# Patient Record
Sex: FEMALE | Race: WHITE | Employment: OTHER | URBAN - METROPOLITAN AREA
[De-identification: names, ages, dates, MRNs, and addresses within clinical notes are randomized per-mention and may not be internally consistent; named-entity substitution may affect disease eponyms.]

---

## 2017-01-03 ENCOUNTER — OFFICE VISIT (OUTPATIENT)
Dept: INTERNAL MEDICINE CLINIC | Facility: CLINIC | Age: 67
End: 2017-01-03

## 2017-01-03 VITALS
WEIGHT: 173 LBS | RESPIRATION RATE: 16 BRPM | BODY MASS INDEX: 26.22 KG/M2 | TEMPERATURE: 98 F | SYSTOLIC BLOOD PRESSURE: 126 MMHG | DIASTOLIC BLOOD PRESSURE: 78 MMHG | HEART RATE: 61 BPM | HEIGHT: 68 IN

## 2017-01-03 DIAGNOSIS — Z12.31 ENCOUNTER FOR SCREENING MAMMOGRAM FOR HIGH-RISK PATIENT: ICD-10-CM

## 2017-01-03 DIAGNOSIS — R92.2 DENSE BREAST TISSUE ON MAMMOGRAM: ICD-10-CM

## 2017-01-03 DIAGNOSIS — D70.9 NEUTROPENIA, UNSPECIFIED TYPE (HCC): Primary | ICD-10-CM

## 2017-01-03 DIAGNOSIS — I10 ESSENTIAL HYPERTENSION: ICD-10-CM

## 2017-01-03 PROBLEM — R92.30 DENSE BREAST TISSUE ON MAMMOGRAM: Status: ACTIVE | Noted: 2017-01-03

## 2017-01-03 PROCEDURE — 99214 OFFICE O/P EST MOD 30 MIN: CPT | Performed by: INTERNAL MEDICINE

## 2017-01-03 PROCEDURE — G0463 HOSPITAL OUTPT CLINIC VISIT: HCPCS | Performed by: INTERNAL MEDICINE

## 2017-01-04 NOTE — ASSESSMENT & PLAN NOTE
Blood pressure 126/78, pulse 61, temperature 98.2 °F (36.8 °C), temperature source Oral, resp. rate 16, height 5' 8\" (1.727 m), weight 173 lb (78.472 kg). The blood pressures look very well controlled at this time.   Currently on losartan at 25 mg 1 tabl

## 2017-01-04 NOTE — PATIENT INSTRUCTIONS
Problem List Items Addressed This Visit        Unprioritized    Hypertension     Blood pressure 126/78, pulse 61, temperature 98.2 °F (36.8 °C), temperature source Oral, resp. rate 16, height 5' 8\" (1.727 m), weight 173 lb (78.472 kg).    The blood pressur

## 2017-01-04 NOTE — PROGRESS NOTES
HPI:    Patient ID: Tc Herbert is a 74year old female.     HPI Comments:   Immunization History  Administered            Date(s) Administered    Influenza Vaccine, High Dose, Preserv Free                          10/01/2016      Pneumococcal (Pre EVERY DAY Disp: 90 tablet Rfl: 1   aspirin 81 MG Oral Tab Take 81 mg by mouth daily. Disp:  Rfl:    Multiple Vitamins-Minerals (CENTRUM SILVER) Oral Tab Take 1 tablet by mouth daily.  Disp:  Rfl:    Omega-3 Fatty Acids (FISH OIL) 1200 MG Oral Capsule [place] ASSESSMENT/PLAN:   Neutropenia, unspecified type (hcc)  (primary encounter diagnosis)  Essential hypertension  Encounter for screening mammogram for high-risk patient  Dense breast tissue on mammogram  Problem List Items Addressed This Visit        Eddie Duval [E]  CBC W Differential W Platelet [E]    Meds This Visit:  No prescriptions requested or ordered in this encounter    Imaging & Referrals:  [facility] OWEN 2D+3D SCREENING BILAT (CPT=77067/00340)  US BREAST BILAT WHOLE BREAST AWBUS(CPT=76641)       XC#[id number]

## 2017-03-21 ENCOUNTER — TELEPHONE (OUTPATIENT)
Dept: OPHTHALMOLOGY | Facility: CLINIC | Age: 67
End: 2017-03-21

## 2017-03-21 NOTE — TELEPHONE ENCOUNTER
Spoke with patient's son to let him know that we need to reschedule patient's eye exam today with Dr. Wendy Tejada.  He will have patient call our office today to reschedule her appointment/nw

## 2017-03-27 RX ORDER — LOSARTAN POTASSIUM 25 MG/1
TABLET ORAL
Qty: 90 TABLET | Refills: 0 | Status: SHIPPED | OUTPATIENT
Start: 2017-03-27 | End: 2017-06-30

## 2017-03-27 NOTE — TELEPHONE ENCOUNTER
Rx request for Losartan Potassium 25 mg, PASSED Hypertension Protocol. Rx filled per protocol.      Hypertensive Medications  Protocol Criteria:  · Appointment scheduled in the past 6 months or in the next 3 months  · BMP or CMP in the past 12 months  · Cre

## 2017-03-30 ENCOUNTER — OFFICE VISIT (OUTPATIENT)
Dept: OPHTHALMOLOGY | Facility: CLINIC | Age: 67
End: 2017-03-30

## 2017-03-30 DIAGNOSIS — H40.003 GLAUCOMA SUSPECT OF BOTH EYES: Primary | ICD-10-CM

## 2017-03-30 DIAGNOSIS — H25.13 AGE-RELATED NUCLEAR CATARACT OF BOTH EYES: ICD-10-CM

## 2017-03-30 PROCEDURE — 92015 DETERMINE REFRACTIVE STATE: CPT | Performed by: OPHTHALMOLOGY

## 2017-03-30 PROCEDURE — 92250 FUNDUS PHOTOGRAPHY W/I&R: CPT | Performed by: OPHTHALMOLOGY

## 2017-03-30 PROCEDURE — 92014 COMPRE OPH EXAM EST PT 1/>: CPT | Performed by: OPHTHALMOLOGY

## 2017-03-30 NOTE — ASSESSMENT & PLAN NOTE
Discussed with patient that she is a glaucoma suspect based on increased cupping of the optic nerve in the right eye and cup to disc asymmetry of the optic nerves. .  Retinal photos taken today to document optic nerves.   Glaucoma diagnostic testing ordered

## 2017-03-30 NOTE — PROGRESS NOTES
Ning Howard is a 74year old female. HPI:     HPI     Patient is here for a complete eye exam. Patient states that she has a decrease in her night vision for the past year.        Last edited by Susanna Macias O.T. on 3/30/2017  3:15 PM.     Adriana Arvizu Slit Lamp and Fundus Exam     External Exam      Right Left    External Normal Normal      Slit Lamp Exam      Right Left    Lids/Lashes Dermatochalasis, Meibomian gland dysfunction Dermatochalasis, Meibomian gland dysfunction    Conjunctiva/Sclera Temp requested or ordered in this encounter     Follow up instructions:  Return for Next avail, VF,OCT and pachy w/ no MD, If glaucoma diagnostics are wnl, 1 yr dil EE.    3/30/2017  Scribed by: Aristeo Arce MD

## 2017-03-30 NOTE — PATIENT INSTRUCTIONS
Age-related nuclear cataract of both eyes  Discussed early cataracts with patient. No treatment recommended at this time. New glasses today; update as needed. Discussed that new prescription is only a slight change.        Glaucoma suspect of both eyes You may also have headaches, nausea, vomiting, and severe pain. If not treated right away, blindness can occur quickly. Date Last Reviewed: 6/1/2015  © 2750-2068 The [facility], [address] [place] [address]. All rights reserved.  Alphonsa Million

## 2017-04-29 ENCOUNTER — OFFICE VISIT (OUTPATIENT)
Dept: OPHTHALMOLOGY | Facility: CLINIC | Age: 67
End: 2017-04-29

## 2017-04-29 DIAGNOSIS — H40.003 GLAUCOMA SUSPECT OF BOTH EYES: ICD-10-CM

## 2017-04-29 PROCEDURE — 92083 EXTENDED VISUAL FIELD XM: CPT | Performed by: OPHTHALMOLOGY

## 2017-04-29 PROCEDURE — 76514 ECHO EXAM OF EYE THICKNESS: CPT | Performed by: OPHTHALMOLOGY

## 2017-04-29 PROCEDURE — 92133 CPTRZD OPH DX IMG PST SGM ON: CPT | Performed by: OPHTHALMOLOGY

## 2017-04-30 NOTE — PROGRESS NOTES
Thom Castaneda is a 69year old female. HPI:     HPI     Patient is here for a glaucoma work up with HVF, OCT and Pachy, no M.D.        Last edited by Jeffrey Aleman on 4/29/2017  8:59 AM.     Patient History:  Past Medical History   Diagnosis Date

## 2017-06-30 ENCOUNTER — TELEPHONE (OUTPATIENT)
Dept: INTERNAL MEDICINE CLINIC | Facility: CLINIC | Age: 67
End: 2017-06-30

## 2017-06-30 RX ORDER — LOSARTAN POTASSIUM 25 MG/1
25 TABLET ORAL
Qty: 90 TABLET | Refills: 0 | Status: CANCELLED | OUTPATIENT
Start: 2017-06-30

## 2017-06-30 RX ORDER — LOSARTAN POTASSIUM 25 MG/1
25 TABLET ORAL
Qty: 90 TABLET | Refills: 0 | Status: SHIPPED | OUTPATIENT
Start: 2017-06-30 | End: 2017-09-21

## 2017-06-30 NOTE — TELEPHONE ENCOUNTER
Hypertensive Medications  Protocol Criteria:  · Appointment scheduled in the past 6 months or in the next 3 months  · BMP or CMP in the past 12 months  · Creatinine result < 2  Recent Outpatient Visits            2 months ago Glaucoma suspect of both eye

## 2017-06-30 NOTE — TELEPHONE ENCOUNTER
LOSARTAN Medication refilled for 90 days per protocol.     Hypertensive Medications  Protocol Criteria:  · Appointment scheduled in the past 6 months or in the next 3 months  · BMP or CMP in the past 12 months  · Creatinine result < 2  Recent Outpatient Vis

## 2017-06-30 NOTE — TELEPHONE ENCOUNTER
Pt states Susu Warner has been faxing over refill request since last week, and have not heard back. Pt states she is completely out.        Current Outpatient Prescriptions:   •  LOSARTAN POTASSIUM 25 MG Oral Tab, TAKE ONE TABLET BY MOUTH EVERY DAY, Disp: 90

## 2017-09-18 ENCOUNTER — LAB ENCOUNTER (OUTPATIENT)
Dept: LAB | Facility: HOSPITAL | Age: 67
End: 2017-09-18
Attending: INTERNAL MEDICINE
Payer: MEDICARE

## 2017-09-18 DIAGNOSIS — I10 ESSENTIAL HYPERTENSION: ICD-10-CM

## 2017-09-18 DIAGNOSIS — D70.9 NEUTROPENIA, UNSPECIFIED TYPE (HCC): ICD-10-CM

## 2017-09-18 LAB
ALBUMIN SERPL BCP-MCNC: 3.7 G/DL (ref 3.5–4.8)
ALBUMIN/GLOB SERPL: 1.5 {RATIO} (ref 1–2)
ALP SERPL-CCNC: 62 U/L (ref 32–100)
ALT SERPL-CCNC: 21 U/L (ref 14–54)
ANION GAP SERPL CALC-SCNC: 4 MMOL/L (ref 0–18)
AST SERPL-CCNC: 26 U/L (ref 15–41)
BASOPHILS # BLD: 0 K/UL (ref 0–0.2)
BASOPHILS NFR BLD: 1 %
BILIRUB SERPL-MCNC: 0.9 MG/DL (ref 0.3–1.2)
BILIRUB UR QL: NEGATIVE
BUN SERPL-MCNC: 14 MG/DL (ref 8–20)
BUN/CREAT SERPL: 19.4 (ref 10–20)
CALCIUM SERPL-MCNC: 8.7 MG/DL (ref 8.5–10.5)
CHLORIDE SERPL-SCNC: 104 MMOL/L (ref 95–110)
CHOLEST SERPL-MCNC: 186 MG/DL (ref 110–200)
CLARITY UR: CLEAR
CO2 SERPL-SCNC: 29 MMOL/L (ref 22–32)
COLOR UR: YELLOW
CREAT SERPL-MCNC: 0.72 MG/DL (ref 0.5–1.5)
EOSINOPHIL # BLD: 0.1 K/UL (ref 0–0.7)
EOSINOPHIL NFR BLD: 4 %
ERYTHROCYTE [DISTWIDTH] IN BLOOD BY AUTOMATED COUNT: 13.8 % (ref 11–15)
GLOBULIN PLAS-MCNC: 2.5 G/DL (ref 2.5–3.7)
GLUCOSE SERPL-MCNC: 84 MG/DL (ref 70–99)
GLUCOSE UR-MCNC: NEGATIVE MG/DL
HCT VFR BLD AUTO: 39.6 % (ref 35–48)
HDLC SERPL-MCNC: 71 MG/DL
HGB BLD-MCNC: 13.5 G/DL (ref 12–16)
HGB UR QL STRIP.AUTO: NEGATIVE
KETONES UR-MCNC: NEGATIVE MG/DL
LDLC SERPL CALC-MCNC: 104 MG/DL (ref 0–99)
LEUKOCYTE ESTERASE UR QL STRIP.AUTO: NEGATIVE
LYMPHOCYTES # BLD: 1.3 K/UL (ref 1–4)
LYMPHOCYTES NFR BLD: 40 %
MCH RBC QN AUTO: 31.2 PG (ref 27–32)
MCHC RBC AUTO-ENTMCNC: 34.2 G/DL (ref 32–37)
MCV RBC AUTO: 91.2 FL (ref 80–100)
MONOCYTES # BLD: 0.3 K/UL (ref 0–1)
MONOCYTES NFR BLD: 10 %
NEUTROPHILS # BLD AUTO: 1.5 K/UL (ref 1.8–7.7)
NEUTROPHILS NFR BLD: 46 %
NITRITE UR QL STRIP.AUTO: NEGATIVE
NONHDLC SERPL-MCNC: 115 MG/DL
OSMOLALITY UR CALC.SUM OF ELEC: 284 MOSM/KG (ref 275–295)
PH UR: 7 [PH] (ref 5–8)
PLATELET # BLD AUTO: 216 K/UL (ref 140–400)
PMV BLD AUTO: 9.1 FL (ref 7.4–10.3)
POTASSIUM SERPL-SCNC: 4 MMOL/L (ref 3.3–5.1)
PROT SERPL-MCNC: 6.2 G/DL (ref 5.9–8.4)
PROT UR-MCNC: NEGATIVE MG/DL
RBC # BLD AUTO: 4.34 M/UL (ref 3.7–5.4)
SODIUM SERPL-SCNC: 137 MMOL/L (ref 136–144)
SP GR UR STRIP: 1.01 (ref 1–1.03)
TRIGL SERPL-MCNC: 55 MG/DL (ref 1–149)
UROBILINOGEN UR STRIP-ACNC: <2
VIT C UR-MCNC: NEGATIVE MG/DL
WBC # BLD AUTO: 3.2 K/UL (ref 4–11)

## 2017-09-18 PROCEDURE — 80053 COMPREHEN METABOLIC PANEL: CPT

## 2017-09-18 PROCEDURE — 80061 LIPID PANEL: CPT

## 2017-09-18 PROCEDURE — 85025 COMPLETE CBC W/AUTO DIFF WBC: CPT

## 2017-09-18 PROCEDURE — 81003 URINALYSIS AUTO W/O SCOPE: CPT

## 2017-09-18 PROCEDURE — 36415 COLL VENOUS BLD VENIPUNCTURE: CPT

## 2017-09-19 ENCOUNTER — PATIENT OUTREACH (OUTPATIENT)
Dept: INTERNAL MEDICINE CLINIC | Facility: CLINIC | Age: 67
End: 2017-09-19

## 2017-09-21 ENCOUNTER — OFFICE VISIT (OUTPATIENT)
Dept: INTERNAL MEDICINE CLINIC | Facility: CLINIC | Age: 67
End: 2017-09-21

## 2017-09-21 ENCOUNTER — MED REC SCAN ONLY (OUTPATIENT)
Dept: INTERNAL MEDICINE CLINIC | Facility: CLINIC | Age: 67
End: 2017-09-21

## 2017-09-21 VITALS
WEIGHT: 168.69 LBS | BODY MASS INDEX: 25.57 KG/M2 | RESPIRATION RATE: 18 BRPM | DIASTOLIC BLOOD PRESSURE: 80 MMHG | HEART RATE: 59 BPM | SYSTOLIC BLOOD PRESSURE: 136 MMHG | TEMPERATURE: 98 F | HEIGHT: 68 IN

## 2017-09-21 DIAGNOSIS — I10 ESSENTIAL HYPERTENSION: ICD-10-CM

## 2017-09-21 DIAGNOSIS — Z23 NEED FOR VACCINATION: Primary | ICD-10-CM

## 2017-09-21 DIAGNOSIS — D70.8 OTHER NEUTROPENIA (HCC): ICD-10-CM

## 2017-09-21 PROCEDURE — 99214 OFFICE O/P EST MOD 30 MIN: CPT | Performed by: INTERNAL MEDICINE

## 2017-09-21 PROCEDURE — G0009 ADMIN PNEUMOCOCCAL VACCINE: HCPCS | Performed by: INTERNAL MEDICINE

## 2017-09-21 PROCEDURE — G0463 HOSPITAL OUTPT CLINIC VISIT: HCPCS | Performed by: INTERNAL MEDICINE

## 2017-09-21 PROCEDURE — 90653 IIV ADJUVANT VACCINE IM: CPT | Performed by: INTERNAL MEDICINE

## 2017-09-21 PROCEDURE — 90732 PPSV23 VACC 2 YRS+ SUBQ/IM: CPT | Performed by: INTERNAL MEDICINE

## 2017-09-21 PROCEDURE — G0008 ADMIN INFLUENZA VIRUS VAC: HCPCS | Performed by: INTERNAL MEDICINE

## 2017-09-21 RX ORDER — LOSARTAN POTASSIUM 25 MG/1
25 TABLET ORAL
Qty: 90 TABLET | Refills: 1 | Status: SHIPPED | OUTPATIENT
Start: 2017-09-21 | End: 2018-03-22

## 2017-09-21 RX ORDER — LOSARTAN POTASSIUM 25 MG/1
25 TABLET ORAL
Qty: 90 TABLET | Refills: 1 | Status: CANCELLED | OUTPATIENT
Start: 2017-09-21

## 2017-09-22 NOTE — PROGRESS NOTES
HPI:    Patient ID: Thom Castaneda is a 69year old female.       Immunization History  Administered            Date(s) Administered    Fluad High dose 72 yr and older (42803)                          09/21/2017      Influenza Vaccine, High Dose, Pres mass index is 25.65 kg/m².   Wt Readings from Last 6 Encounters:  09/21/17 : 168 lb 11.2 oz (76.5 kg)  01/03/17 : 173 lb (78.5 kg)  06/30/16 : 167 lb (75.8 kg)  12/08/15 : 163 lb (73.9 kg)  06/16/15 : 167 lb 6.4 oz (75.9 kg)  04/28/15 : 170 lb (77.1 kg) losartan at 25 mg 1 tablet once daily and will recheck labs and follow-up in 6 months for a full physical.  Pneumovax 23 provided today         Relevant Medications    Losartan Potassium 25 MG Oral Tab    Other Relevant Orders    CBC WITH DIFFERENTIAL WITH

## 2017-09-22 NOTE — PATIENT INSTRUCTIONS
Problem List Items Addressed This Visit        Unprioritized    Hypertension     Blood pressure 143/87, pulse 59, temperature 98.1 °F (36.7 °C), temperature source Oral, resp.  rate 18, height 5' 8\" (1.727 m), weight 168 lb 11.2 oz (76.5 kg), not currently

## 2017-09-22 NOTE — ASSESSMENT & PLAN NOTE
Blood pressure 143/87, pulse 59, temperature 98.1 °F (36.7 °C), temperature source Oral, resp. rate 18, height 5' 8\" (1.727 m), weight 168 lb 11.2 oz (76.5 kg), not currently breastfeeding. Blood pressures look stable and well controlled.   EGFR looks nor

## 2017-09-22 NOTE — ASSESSMENT & PLAN NOTE
Unexplained first episode of neutropenia. Patient is asymptomatic and has not been having any recent problems with coughs or colds. Recheck labs about once a month for the next 2-3 months and will follow up.   Patient has been advised to give me a call if

## 2017-10-23 ENCOUNTER — LAB ENCOUNTER (OUTPATIENT)
Dept: LAB | Facility: HOSPITAL | Age: 67
End: 2017-10-23
Attending: INTERNAL MEDICINE
Payer: MEDICARE

## 2017-10-23 DIAGNOSIS — D70.8 OTHER NEUTROPENIA (HCC): ICD-10-CM

## 2017-10-23 PROCEDURE — 85025 COMPLETE CBC W/AUTO DIFF WBC: CPT

## 2017-10-23 PROCEDURE — 36415 COLL VENOUS BLD VENIPUNCTURE: CPT

## 2017-10-26 ENCOUNTER — TELEPHONE (OUTPATIENT)
Dept: INTERNAL MEDICINE CLINIC | Facility: CLINIC | Age: 67
End: 2017-10-26

## 2017-10-26 NOTE — TELEPHONE ENCOUNTER
Per pt, she had a blood test Monday in [facility] and would like to tell AMPARO.

## 2017-10-29 ENCOUNTER — OFFICE VISIT (OUTPATIENT)
Dept: FAMILY MEDICINE CLINIC | Facility: CLINIC | Age: 67
End: 2017-10-29

## 2017-10-29 VITALS
DIASTOLIC BLOOD PRESSURE: 84 MMHG | SYSTOLIC BLOOD PRESSURE: 124 MMHG | OXYGEN SATURATION: 98 % | BODY MASS INDEX: 25.61 KG/M2 | WEIGHT: 169 LBS | HEIGHT: 68 IN | RESPIRATION RATE: 18 BRPM | TEMPERATURE: 98 F | HEART RATE: 80 BPM

## 2017-10-29 DIAGNOSIS — J01.00 ACUTE NON-RECURRENT MAXILLARY SINUSITIS: Primary | ICD-10-CM

## 2017-10-29 PROCEDURE — 99202 OFFICE O/P NEW SF 15 MIN: CPT | Performed by: PHYSICIAN ASSISTANT

## 2017-10-29 RX ORDER — AMOXICILLIN AND CLAVULANATE POTASSIUM 875; 125 MG/1; MG/1
1 TABLET, FILM COATED ORAL 2 TIMES DAILY
Qty: 20 TABLET | Refills: 0 | Status: SHIPPED | OUTPATIENT
Start: 2017-10-29 | End: 2017-11-08

## 2017-10-29 RX ORDER — AMOXICILLIN AND CLAVULANATE POTASSIUM 875; 125 MG/1; MG/1
1 TABLET, FILM COATED ORAL 2 TIMES DAILY
Qty: 20 TABLET | Refills: 0 | Status: SHIPPED | OUTPATIENT
Start: 2017-10-29 | End: 2017-10-29

## 2017-10-29 NOTE — PROGRESS NOTES
CHIEF COMPLAINT:   Patient presents with:  Cough/URI: ongoing 2.5 eeks, has \"low white count\"      HPI:   Almond Mohs is a 69year old female who presents for upper respiratory symptoms for  2.5 weeks.  Patient reports sore throat, congestion, dr EARS: TM's left is mildly erythematous and retracted. , No bulging, No retraction,No  fluid, bony landmarks visible  NOSE: Nostrils patent, minimal nasal discharge, nasal mucosa mildly inflamed   THROAT: Oral mucosa pink, moist. Posterior pharynx is mildly Sinuses are air-filled spaces in the skull behind the face. They are kept moist and clean by a lining of mucosa. Things such as pollen, smoke, and chemical fumes can irritate the mucosa. It can then swell up.  As a response to irritation, the mucosa makes m © 2200-7159 The [address] [facility], [facility] [place]. All rights reserved. This information is not intended as a substitute for professional medical care. Always follow your healthcare professional's instructions.             The

## 2017-10-29 NOTE — PATIENT INSTRUCTIONS
Acute Sinusitis    Acute sinusitis is irritation and swelling of the sinuses. It is usually caused by a viral infection after a common cold. Your doctor can help you find relief. What is acute sinusitis?   Sinuses are air-filled spaces in the skull behin © 9645-6189 The [address]. [address] [facility] [place] [place]. All rights reserved. This information is not intended as a substitute for professional medical care. Always follow your healthcare professional's instructions.

## 2018-01-19 ENCOUNTER — TELEPHONE (OUTPATIENT)
Dept: INTERNAL MEDICINE CLINIC | Facility: CLINIC | Age: 68
End: 2018-01-19

## 2018-01-19 DIAGNOSIS — R92.2 DENSE BREAST: Primary | ICD-10-CM

## 2018-01-30 ENCOUNTER — HOSPITAL ENCOUNTER (OUTPATIENT)
Dept: MAMMOGRAPHY | Facility: HOSPITAL | Age: 68
Discharge: HOME | End: 2018-01-30
Attending: INTERNAL MEDICINE
Payer: MEDICARE

## 2018-01-30 DIAGNOSIS — R92.2 DENSE BREAST: ICD-10-CM

## 2018-01-30 PROCEDURE — 77063 BREAST TOMOSYNTHESIS BI: CPT | Performed by: INTERNAL MEDICINE

## 2018-01-30 PROCEDURE — 77067 SCR MAMMO BI INCL CAD: CPT | Performed by: INTERNAL MEDICINE

## 2018-03-19 ENCOUNTER — LAB ENCOUNTER (OUTPATIENT)
Dept: LAB | Facility: HOSPITAL | Age: 68
End: 2018-03-19
Attending: INTERNAL MEDICINE
Payer: MEDICARE

## 2018-03-19 DIAGNOSIS — I10 ESSENTIAL HYPERTENSION: ICD-10-CM

## 2018-03-19 LAB
ALBUMIN SERPL BCP-MCNC: 3.8 G/DL (ref 3.5–4.8)
ALBUMIN/GLOB SERPL: 1.4 {RATIO} (ref 1–2)
ALP SERPL-CCNC: 66 U/L (ref 32–100)
ALT SERPL-CCNC: 22 U/L (ref 14–54)
ANION GAP SERPL CALC-SCNC: 6 MMOL/L (ref 0–18)
AST SERPL-CCNC: 25 U/L (ref 15–41)
BASOPHILS # BLD: 0 K/UL (ref 0–0.2)
BASOPHILS NFR BLD: 1 %
BILIRUB SERPL-MCNC: 0.8 MG/DL (ref 0.3–1.2)
BILIRUB UR QL: NEGATIVE
BUN SERPL-MCNC: 15 MG/DL (ref 8–20)
BUN/CREAT SERPL: 22.7 (ref 10–20)
CALCIUM SERPL-MCNC: 9.1 MG/DL (ref 8.5–10.5)
CHLORIDE SERPL-SCNC: 101 MMOL/L (ref 95–110)
CHOLEST SERPL-MCNC: 191 MG/DL (ref 110–200)
CLARITY UR: CLEAR
CO2 SERPL-SCNC: 30 MMOL/L (ref 22–32)
COLOR UR: YELLOW
CREAT SERPL-MCNC: 0.66 MG/DL (ref 0.5–1.5)
EOSINOPHIL # BLD: 0.1 K/UL (ref 0–0.7)
EOSINOPHIL NFR BLD: 4 %
ERYTHROCYTE [DISTWIDTH] IN BLOOD BY AUTOMATED COUNT: 13.8 % (ref 11–15)
GLOBULIN PLAS-MCNC: 2.8 G/DL (ref 2.5–3.7)
GLUCOSE SERPL-MCNC: 88 MG/DL (ref 70–99)
GLUCOSE UR-MCNC: NEGATIVE MG/DL
HCT VFR BLD AUTO: 42.8 % (ref 35–48)
HDLC SERPL-MCNC: 72 MG/DL
HGB BLD-MCNC: 14.4 G/DL (ref 12–16)
HGB UR QL STRIP.AUTO: NEGATIVE
KETONES UR-MCNC: NEGATIVE MG/DL
LDLC SERPL CALC-MCNC: 108 MG/DL (ref 0–99)
LEUKOCYTE ESTERASE UR QL STRIP.AUTO: NEGATIVE
LYMPHOCYTES # BLD: 1.1 K/UL (ref 1–4)
LYMPHOCYTES NFR BLD: 34 %
MCH RBC QN AUTO: 30.5 PG (ref 27–32)
MCHC RBC AUTO-ENTMCNC: 33.7 G/DL (ref 32–37)
MCV RBC AUTO: 90.4 FL (ref 80–100)
MONOCYTES # BLD: 0.3 K/UL (ref 0–1)
MONOCYTES NFR BLD: 10 %
NEUTROPHILS # BLD AUTO: 1.6 K/UL (ref 1.8–7.7)
NEUTROPHILS NFR BLD: 51 %
NITRITE UR QL STRIP.AUTO: NEGATIVE
NONHDLC SERPL-MCNC: 119 MG/DL
OSMOLALITY UR CALC.SUM OF ELEC: 284 MOSM/KG (ref 275–295)
PATIENT FASTING: YES
PH UR: 7 [PH] (ref 5–8)
PLATELET # BLD AUTO: 211 K/UL (ref 140–400)
PMV BLD AUTO: 8.8 FL (ref 7.4–10.3)
POTASSIUM SERPL-SCNC: 4.7 MMOL/L (ref 3.3–5.1)
PROT SERPL-MCNC: 6.6 G/DL (ref 5.9–8.4)
PROT UR-MCNC: NEGATIVE MG/DL
RBC # BLD AUTO: 4.73 M/UL (ref 3.7–5.4)
SODIUM SERPL-SCNC: 137 MMOL/L (ref 136–144)
SP GR UR STRIP: 1.02 (ref 1–1.03)
TRIGL SERPL-MCNC: 53 MG/DL (ref 1–149)
TSH SERPL-ACNC: 2.78 UIU/ML (ref 0.45–5.33)
UROBILINOGEN UR STRIP-ACNC: <2
VIT C UR-MCNC: NEGATIVE MG/DL
WBC # BLD AUTO: 3.1 K/UL (ref 4–11)

## 2018-03-19 PROCEDURE — 80061 LIPID PANEL: CPT

## 2018-03-19 PROCEDURE — 84443 ASSAY THYROID STIM HORMONE: CPT

## 2018-03-19 PROCEDURE — 36415 COLL VENOUS BLD VENIPUNCTURE: CPT

## 2018-03-19 PROCEDURE — 81003 URINALYSIS AUTO W/O SCOPE: CPT

## 2018-03-19 PROCEDURE — 80053 COMPREHEN METABOLIC PANEL: CPT

## 2018-03-19 PROCEDURE — 85025 COMPLETE CBC W/AUTO DIFF WBC: CPT

## 2018-03-22 RX ORDER — LOSARTAN POTASSIUM 25 MG/1
TABLET ORAL
Qty: 90 TABLET | Refills: 1 | Status: SHIPPED | OUTPATIENT
Start: 2018-03-22 | End: 2018-03-29

## 2018-03-29 ENCOUNTER — OFFICE VISIT (OUTPATIENT)
Dept: INTERNAL MEDICINE CLINIC | Facility: CLINIC | Age: 68
End: 2018-03-29

## 2018-03-29 VITALS
WEIGHT: 170 LBS | RESPIRATION RATE: 16 BRPM | TEMPERATURE: 98 F | SYSTOLIC BLOOD PRESSURE: 150 MMHG | HEART RATE: 60 BPM | BODY MASS INDEX: 25.76 KG/M2 | HEIGHT: 68 IN | DIASTOLIC BLOOD PRESSURE: 82 MMHG

## 2018-03-29 DIAGNOSIS — Z00.00 ENCOUNTER FOR ANNUAL HEALTH EXAMINATION: ICD-10-CM

## 2018-03-29 DIAGNOSIS — I10 ESSENTIAL HYPERTENSION: ICD-10-CM

## 2018-03-29 DIAGNOSIS — Z12.11 SCREENING FOR COLON CANCER: ICD-10-CM

## 2018-03-29 DIAGNOSIS — R92.2 DENSE BREAST TISSUE ON MAMMOGRAM: Primary | ICD-10-CM

## 2018-03-29 DIAGNOSIS — H40.003 GLAUCOMA SUSPECT OF BOTH EYES: ICD-10-CM

## 2018-03-29 DIAGNOSIS — Z00.00 MEDICARE ANNUAL WELLNESS VISIT, SUBSEQUENT: ICD-10-CM

## 2018-03-29 DIAGNOSIS — Z78.0 POSTMENOPAUSAL: ICD-10-CM

## 2018-03-29 DIAGNOSIS — Z78.0 MENOPAUSE: ICD-10-CM

## 2018-03-29 DIAGNOSIS — Z85.3 HISTORY OF LEFT BREAST CANCER: ICD-10-CM

## 2018-03-29 DIAGNOSIS — Z01.419 ENCOUNTER FOR ROUTINE GYNECOLOGICAL EXAMINATION WITH PAPANICOLAOU SMEAR OF CERVIX: ICD-10-CM

## 2018-03-29 DIAGNOSIS — Z01.419 WELL WOMAN EXAM WITH ROUTINE GYNECOLOGICAL EXAM: ICD-10-CM

## 2018-03-29 DIAGNOSIS — H25.13 AGE-RELATED NUCLEAR CATARACT OF BOTH EYES: ICD-10-CM

## 2018-03-29 DIAGNOSIS — D70.8 OTHER NEUTROPENIA (HCC): ICD-10-CM

## 2018-03-29 PROCEDURE — G0438 PPPS, INITIAL VISIT: HCPCS | Performed by: INTERNAL MEDICINE

## 2018-03-29 PROCEDURE — 96160 PT-FOCUSED HLTH RISK ASSMT: CPT | Performed by: INTERNAL MEDICINE

## 2018-03-29 RX ORDER — LOSARTAN POTASSIUM 50 MG/1
TABLET ORAL
Qty: 90 TABLET | Refills: 1 | Status: SHIPPED | OUTPATIENT
Start: 2018-03-29 | End: 2018-11-15

## 2018-03-29 NOTE — ASSESSMENT & PLAN NOTE
Normal exam.  Labs as ordered. Skin check normal.  No significant abnormal nevi. Vision and hearing exam normal.  Breast exam completed–no palpable abnormalities, discharge from the nipples or axillary adenopathy.   No cervical or inguinal lymphadenopathy

## 2018-03-29 NOTE — ASSESSMENT & PLAN NOTE
Blood pressure 150/82, pulse 60, temperature 98 °F (36.7 °C), temperature source Oral, resp. rate 16, height 5' 8\" (1.727 m), weight 170 lb (77.1 kg), not currently breastfeeding. Blood pressure rechecked, remains elevated.   Currently on losartan at 25 m

## 2018-03-29 NOTE — ASSESSMENT & PLAN NOTE
Category C breast on evaluation. Mammogram looked normal recently. Normal palpation at this time. No axillary lymphadenopathy.

## 2018-03-29 NOTE — PROGRESS NOTES
HPI:   Caro Brown is a 69year old female who presents for a Medicare Subsequent Annual Wellness visit (Pt already had Initial Annual Wellness).     Her last annual assessment has been over 1 year: Annual Physical due on 02/17/2016         Fall/ Encounter for routine gynecological examination with Papanicolaou smear of cervix    Wt Readings from Last 3 Encounters:  03/29/18 : 170 lb (77.1 kg)  10/29/17 : 169 lb (76.7 kg)  09/21/17 : 168 lb 11.2 oz (76.5 kg)     Last Cholesterol Labs:     Lab Resul denies shortness of breath with exertion  CARDIOVASCULAR: denies chest pain on exertion  GI: denies abdominal pain, denies heartburn  : denies dysuria, vaginal discharge or itching, no complaint of urinary incontinence   MUSCULOSKELETAL: denies back pain will reply improperly:  No   Family members and friends have told me they think I may have hearing loss:   No               Visual Acuity  Right Eye Visual Acuity: Corrected Right Eye Chart Acuity: 20/40   Left Eye Visual Acuity: Corrected Left Eye Chart Ac exhibits no tenderness or effusion. Lymphadenopathy:     She has no cervical adenopathy. Neurological: She is alert and oriented to person, place, and time. She displays normal reflexes. No cranial nerve deficit, sensory deficit or motor deficit.  Coord year–Nick synthesis look normal.         Hypertension     Blood pressure 150/82, pulse 60, temperature 98 °F (36.7 °C), temperature source Oral, resp. rate 16, height 5' 8\" (1.727 m), weight 170 lb (77.1 kg), not currently breastfeeding.   Blood pressure r COLLECTION (Completed)    THINPREP PAP SMEAR ONLY    Postmenopausal        Relevant Orders    XR DEXA BONE DENSITOMETRY (CPT=77080)    Screening for colon cancer        Relevant Orders    GASTRO - INTERNAL    OCCULT BLOOD, FECAL, IMMUNOASSAY         PLAN S of chart, separate sheet to patient  [address] Internal Lab or Procedure External Lab or Procedure   Diabetes Screening      HbgA1C   Annually No results found for: A1C No flowsheet data found.     Fasting Blood Sugar ( birthday    Pneumococcal 23 (Pneumovax)  Covered Once after 65 09/21/2017 Please get once after your 65th birthday    Hepatitis B for Moderate/High Risk No vaccine history found Medium/high risk factors:   End-stage renal disease   Hemophiliacs who receive

## 2018-03-29 NOTE — ASSESSMENT & PLAN NOTE
Pt feels she has not had a Pap for well over 10 years,she did have an abnormal pap and is s/p a colposcopy,she did have a normal pap after but has not followed up since. Recheck pap done today to ensure that there is no recurrence of an abnormality.

## 2018-03-29 NOTE — ASSESSMENT & PLAN NOTE
Yearly follow-up with ophthalmology discussed– due April 2017. Imagination last year–normal visual fields.   Patient does wear glasses

## 2018-03-29 NOTE — ASSESSMENT & PLAN NOTE
Status post lumpectomy–left breast 10 years back.   Mammogram completed earlier this year–Nick synthesis look normal.

## 2018-03-29 NOTE — PATIENT INSTRUCTIONS
Problem List Items Addressed This Visit        Unprioritized    Age-related nuclear cataract of both eyes     Early cataracts at this time, no visual impairment. Is being monitored by ophthalmology.          Dense breast tissue on mammogram - Primary     C orifices intact. Rectal exam normal,no palpable abnormalities. Hemorrhoids-small internal present.   Brown stools,guaic negetive  Colonoscopy,10 Years due on 01/01/2017  Pelvic exam completed–no cervical movement tenderness, adnexal palpable abnormalities gestational diabetes or birth of baby weighing more than 9 pounds     Covered at least every 3 years,           Glucose (mg/dL)   Date Value   03/19/2018 88   ----------  GLUCOSE (P) (mg/dL)   Date Value   06/26/2016 87   ---------- Medicare covers annuall a referral   Bone Density Screening      Bone density screening   Covered every 2 yrs after age 72    Covered yearly for Long term Glucocorticoid medication (Steroids) Requires diagnosis related to osteoporosis or estrogen deficiency.    Biennial benefit un performed in visit on 02/17/15  -TETANUS, DIPHTHERIA TOXOIDS AND ACELLULAR PERTUSIS VACCINE (TDAP), >7 YEARS, IM USE    This may be covered with your prescription benefits, but Medicare does not cover unless Medically needed    Zoster (Not covered by Medic

## 2018-04-03 ENCOUNTER — HOSPITAL ENCOUNTER (OUTPATIENT)
Dept: BONE DENSITY | Facility: HOSPITAL | Age: 68
Discharge: HOME | End: 2018-04-03
Attending: INTERNAL MEDICINE
Payer: MEDICARE

## 2018-04-03 DIAGNOSIS — Z78.0 POSTMENOPAUSAL: ICD-10-CM

## 2018-04-03 PROCEDURE — 77080 DXA BONE DENSITY AXIAL: CPT | Performed by: INTERNAL MEDICINE

## 2018-04-05 ENCOUNTER — OFFICE VISIT (OUTPATIENT)
Dept: OPHTHALMOLOGY | Facility: CLINIC | Age: 68
End: 2018-04-05

## 2018-04-05 DIAGNOSIS — H43.391 VITREOUS FLOATERS OF RIGHT EYE: ICD-10-CM

## 2018-04-05 DIAGNOSIS — H25.13 AGE-RELATED NUCLEAR CATARACT OF BOTH EYES: ICD-10-CM

## 2018-04-05 DIAGNOSIS — H40.003 GLAUCOMA SUSPECT OF BOTH EYES: Primary | ICD-10-CM

## 2018-04-05 PROCEDURE — 92014 COMPRE OPH EXAM EST PT 1/>: CPT | Performed by: OPHTHALMOLOGY

## 2018-04-05 PROCEDURE — 92015 DETERMINE REFRACTIVE STATE: CPT | Performed by: OPHTHALMOLOGY

## 2018-04-05 NOTE — PATIENT INSTRUCTIONS
Glaucoma suspect of both eyes  Patient is a glaucoma suspect due to increased cupping of the optic nerves in both eyes. Patient had normal glaucoma diagnostics last year. IOP is normal today. Optic nerves are stable.   There is no diagnosis of glaucoma at

## 2018-04-05 NOTE — PROGRESS NOTES
Stefani Peres is a 69year old female. HPI:     HPI     Pt complains of blurred vision OU at distance and near with her glasses and would like an updated Rx.      Last edited by Rivka Meredith O.T. on 4/5/2018  4:32 PM. (History)        Ivis Normal          Dilation     Both eyes:  Paremyd @ 4:40 PM            Slit Lamp and Fundus Exam     External Exam       Right Left    External Normal Normal          Slit Lamp Exam       Right Left    Lids/Lashes Dermatochalasis, Meibomian gland dysfunctio in this encounter. Meds This Visit:    No prescriptions requested or ordered in this encounter     Follow up instructions:  Return in about 1 year (around 4/5/2019) for EE and photos.     4/5/2018  Scribed by: Cielo Palmer MD

## 2018-04-05 NOTE — ASSESSMENT & PLAN NOTE
Patient is a glaucoma suspect due to increased cupping of the optic nerves in both eyes. Patient had normal glaucoma diagnostics last year. IOP is normal today. Optic nerves are stable.   There is no diagnosis of glaucoma at this time, but will follow in

## 2018-04-06 ENCOUNTER — APPOINTMENT (OUTPATIENT)
Dept: LAB | Facility: HOSPITAL | Age: 68
End: 2018-04-06
Attending: INTERNAL MEDICINE
Payer: MEDICARE

## 2018-04-06 DIAGNOSIS — Z12.11 SCREENING FOR COLON CANCER: ICD-10-CM

## 2018-04-06 PROCEDURE — 82274 ASSAY TEST FOR BLOOD FECAL: CPT

## 2018-04-28 ENCOUNTER — HOSPITAL ENCOUNTER (OUTPATIENT)
Dept: CT IMAGING | Age: 68
Discharge: HOME | End: 2018-04-28
Attending: INTERNAL MEDICINE

## 2018-04-28 DIAGNOSIS — Z13.9 ENCOUNTER FOR SCREENING: ICD-10-CM

## 2018-04-28 NOTE — PROGRESS NOTES
[facility] [address] SCORE=0  SF=812/92  Cholestec labs as follows:  KH=405  HDL=72  LDL=na  TG=<45  GLUCOSE=88 fasting  All results and risk factors discussed with patient; all questions and concerns addressed.   Educational hand

## 2018-05-15 ENCOUNTER — OFFICE VISIT (OUTPATIENT)
Dept: INTERNAL MEDICINE CLINIC | Facility: CLINIC | Age: 68
End: 2018-05-15

## 2018-05-15 VITALS
WEIGHT: 164 LBS | HEART RATE: 52 BPM | BODY MASS INDEX: 24.86 KG/M2 | HEIGHT: 68 IN | SYSTOLIC BLOOD PRESSURE: 126 MMHG | RESPIRATION RATE: 16 BRPM | DIASTOLIC BLOOD PRESSURE: 84 MMHG

## 2018-05-15 DIAGNOSIS — H57.9 ALLERGIC EYE REACTION: ICD-10-CM

## 2018-05-15 DIAGNOSIS — I10 ESSENTIAL HYPERTENSION: Primary | ICD-10-CM

## 2018-05-15 PROCEDURE — G0463 HOSPITAL OUTPT CLINIC VISIT: HCPCS | Performed by: INTERNAL MEDICINE

## 2018-05-15 PROCEDURE — 99214 OFFICE O/P EST MOD 30 MIN: CPT | Performed by: INTERNAL MEDICINE

## 2018-05-15 RX ORDER — PREDNISOLONE ACETATE 10 MG/ML
1 SUSPENSION/ DROPS OPHTHALMIC
Qty: 1 BOTTLE | Refills: 0 | Status: SHIPPED | OUTPATIENT
Start: 2018-05-15 | End: 2018-11-29 | Stop reason: ALTCHOICE

## 2018-05-15 NOTE — PROGRESS NOTES
HPI:    Patient ID: Ann Giles is a 77year old female. Hypertension   This is a chronic problem. The problem has been gradually worsening (Patient has been on losartan at 25 mg once a day, tolerating it well.   Monitors blood pressure at home Disp:  Rfl:      Allergies:No Known Allergies      05/15/18  1904   BP: 126/84   Pulse:    Resp:      Body mass index is 24.94 kg/m².    Wt Readings from Last 6 Encounters:  05/15/18 : 164 lb (74.4 kg)  03/29/18 : 170 lb (77.1 kg)  10/29/17 : 169 lb (76.7 k directed. Do not continue this on a daily basis as can cause worsening glaucoma         Hypertension - Primary     Blood pressure 126/84, pulse 52, resp. rate 16, height 5' 8\" (1.727 m), weight 164 lb (74.4 kg), not currently breastfeeding.   Pressure was

## 2018-05-16 NOTE — PATIENT INSTRUCTIONS
Problem List Items Addressed This Visit        Unprioritized    Allergic eye reaction     Prednisolone eyedrops used very sparingly over the next few days as directed.   Do not continue this on a daily basis as can cause worsening glaucoma         Hypertens

## 2018-05-16 NOTE — ASSESSMENT & PLAN NOTE
Prednisolone eyedrops used very sparingly over the next few days as directed.   Do not continue this on a daily basis as can cause worsening glaucoma

## 2018-05-16 NOTE — ASSESSMENT & PLAN NOTE
Blood pressure 126/84, pulse 52, resp. rate 16, height 5' 8\" (1.727 m), weight 164 lb (74.4 kg), not currently breastfeeding. Pressure was initially elevated upon arrival.  Upon recheck however much improved.   Patient has brought in all her home records

## 2018-11-16 RX ORDER — LOSARTAN POTASSIUM 50 MG/1
TABLET ORAL
Qty: 90 TABLET | Refills: 1 | Status: SHIPPED | OUTPATIENT
Start: 2018-11-16 | End: 2019-05-17

## 2018-11-23 ENCOUNTER — LAB ENCOUNTER (OUTPATIENT)
Dept: LAB | Facility: HOSPITAL | Age: 68
End: 2018-11-23
Attending: INTERNAL MEDICINE
Payer: MEDICARE

## 2018-11-23 DIAGNOSIS — I10 ESSENTIAL HYPERTENSION: ICD-10-CM

## 2018-11-23 DIAGNOSIS — D70.8 OTHER NEUTROPENIA (HCC): ICD-10-CM

## 2018-11-23 PROCEDURE — 80053 COMPREHEN METABOLIC PANEL: CPT

## 2018-11-23 PROCEDURE — 85025 COMPLETE CBC W/AUTO DIFF WBC: CPT

## 2018-11-23 PROCEDURE — 36415 COLL VENOUS BLD VENIPUNCTURE: CPT

## 2018-11-29 ENCOUNTER — OFFICE VISIT (OUTPATIENT)
Dept: INTERNAL MEDICINE CLINIC | Facility: CLINIC | Age: 68
End: 2018-11-29
Payer: MEDICARE

## 2018-11-29 VITALS
RESPIRATION RATE: 18 BRPM | DIASTOLIC BLOOD PRESSURE: 83 MMHG | WEIGHT: 166.38 LBS | SYSTOLIC BLOOD PRESSURE: 177 MMHG | TEMPERATURE: 98 F | OXYGEN SATURATION: 97 % | HEIGHT: 68 IN | BODY MASS INDEX: 25.22 KG/M2 | HEART RATE: 60 BPM

## 2018-11-29 DIAGNOSIS — I10 ESSENTIAL HYPERTENSION: Primary | ICD-10-CM

## 2018-11-29 DIAGNOSIS — R92.2 DENSE BREAST TISSUE ON MAMMOGRAM: ICD-10-CM

## 2018-11-29 DIAGNOSIS — D70.9 NEUTROPENIA, UNSPECIFIED TYPE (HCC): ICD-10-CM

## 2018-11-29 PROCEDURE — 99214 OFFICE O/P EST MOD 30 MIN: CPT | Performed by: INTERNAL MEDICINE

## 2018-11-29 PROCEDURE — G0463 HOSPITAL OUTPT CLINIC VISIT: HCPCS | Performed by: INTERNAL MEDICINE

## 2018-11-29 RX ORDER — HYDROCHLOROTHIAZIDE 12.5 MG/1
12.5 TABLET ORAL DAILY
Qty: 90 TABLET | Refills: 1 | Status: SHIPPED | OUTPATIENT
Start: 2018-11-29 | End: 2019-05-17

## 2018-11-30 NOTE — ASSESSMENT & PLAN NOTE
Blood pressure (!) 177/83, pulse 60, temperature 97.5 °F (36.4 °C), temperature source Oral, resp. rate 18, height 5' 8\" (1.727 m), weight 166 lb 6.4 oz (75.5 kg), SpO2 97 %, not currently breastfeeding. Blood pressure rechecked was 158/84.   Heart rate i

## 2018-11-30 NOTE — ASSESSMENT & PLAN NOTE
Labs completed recently shows persistent low white count but differential looks normal.  No neutropenia at this time. No palpable cervical lymphadenopathy. She does not have any infections.   Continue to monitor and recheck labs prior to the next office v

## 2018-11-30 NOTE — PROGRESS NOTES
HPI:    Patient ID: Leonardo Villalta is a 77year old female. Labs discussed. Hypertension   This is a chronic problem. The problem has been gradually worsening (Patient has been on losartan at 25 mg once a day, tolerating it well.   Monitors bl Oral Tab Take 1 tablet by mouth daily. Disp:  Rfl:      Allergies:No Known Allergies      11/29/18 1834   BP: (!) 177/83   Pulse: 60   Resp: 18   Temp: 97.5 °F (36.4 °C)     Body mass index is 25.3 kg/m².     PHYSICAL EXAM:   Physical Exam   Constitutional rechecked was 158/84. Heart rate is stable but on the lower side of normal.  Kidney functions per labs done recently looked normal.  EGFR looks normal.  Patient does not have any chest pain, palpitations or shortness of breath.   She does not have any lowe

## 2018-11-30 NOTE — PATIENT INSTRUCTIONS
Problem List Items Addressed This Visit        Unprioritized    Dense breast tissue on mammogram    Relevant Orders    [facility] OWEN 2D+3D SCREENING BILAT (CPT=77067/33558)    Hypertension - Primary     Blood pressure (!) 177/83, pulse 60, temperature 97.5 °F (

## 2019-01-14 ENCOUNTER — TELEPHONE (OUTPATIENT)
Dept: CASE MANAGEMENT | Age: 69
End: 2019-01-14

## 2019-01-14 NOTE — TELEPHONE ENCOUNTER
Patient is eligible for a 2019 Annual Medicare Health Assessment. Discussed in detail w/patient.   Patient states that PCP told her that she doesn't need a Medicare exam.

## 2019-02-06 ENCOUNTER — OFFICE VISIT (OUTPATIENT)
Dept: FAMILY MEDICINE CLINIC | Facility: CLINIC | Age: 69
End: 2019-02-06
Payer: MEDICARE

## 2019-02-06 VITALS
HEART RATE: 61 BPM | HEIGHT: 68 IN | BODY MASS INDEX: 24.86 KG/M2 | SYSTOLIC BLOOD PRESSURE: 133 MMHG | RESPIRATION RATE: 16 BRPM | WEIGHT: 164 LBS | TEMPERATURE: 98 F | DIASTOLIC BLOOD PRESSURE: 78 MMHG | OXYGEN SATURATION: 97 %

## 2019-02-06 DIAGNOSIS — J01.00 ACUTE MAXILLARY SINUSITIS, RECURRENCE NOT SPECIFIED: ICD-10-CM

## 2019-02-06 DIAGNOSIS — J02.9 SORE THROAT: Primary | ICD-10-CM

## 2019-02-06 LAB
CONTROL LINE PRESENT WITH A CLEAR BACKGROUND (YES/NO): YES YES/NO
STREP GRP A CUL-SCR: NEGATIVE

## 2019-02-06 PROCEDURE — 87880 STREP A ASSAY W/OPTIC: CPT | Performed by: NURSE PRACTITIONER

## 2019-02-06 PROCEDURE — 99213 OFFICE O/P EST LOW 20 MIN: CPT | Performed by: NURSE PRACTITIONER

## 2019-02-06 RX ORDER — AZITHROMYCIN 250 MG/1
TABLET, FILM COATED ORAL
Qty: 6 TABLET | Refills: 0 | Status: SHIPPED | OUTPATIENT
Start: 2019-02-06 | End: 2019-02-06 | Stop reason: CLARIF

## 2019-02-06 RX ORDER — AZITHROMYCIN 250 MG/1
TABLET, FILM COATED ORAL
Qty: 6 TABLET | Refills: 0 | Status: SHIPPED | OUTPATIENT
Start: 2019-02-06 | End: 2019-03-21 | Stop reason: ALTCHOICE

## 2019-02-07 NOTE — PROGRESS NOTES
CHIEF COMPLAINT:   Patient presents with:  Sore Throat      HPI:   Ann Giles is a 77year old female who presents for sinus congestion for 3 weeks. Symptoms have been pesisting since onset.  Sinus congestion/pain is described as a pressure and is SKIN: no rashes or abnormal skin lesions  HEENT: See HPI. LUNGS: denies shortness of breath or wheezing, See HPI  GI: denies N/V/C or abdominal pain  NEURO: + sinus headaches. No numbness or tingling in face. EXAM:   /78   Pulse 61   Temp 98. 2 The sinuses are air-filled spaces within the bones of the face. They connect to the inside of the nose. Sinusitis is an inflammation of the tissue that lines the sinuses. Sinusitis can occur during a cold.  It can also happen due to allergies to pollens and · Do not use nasal rinses or irrigation during an acute sinus infection, unless your healthcare provider tells you to. Rinsing may spread the infection to other areas in your sinuses.   · Use acetaminophen or ibuprofen to control pain, unless another pain m

## 2019-03-21 ENCOUNTER — OFFICE VISIT (OUTPATIENT)
Dept: FAMILY MEDICINE CLINIC | Facility: CLINIC | Age: 69
End: 2019-03-21
Payer: MEDICARE

## 2019-03-21 VITALS
SYSTOLIC BLOOD PRESSURE: 118 MMHG | TEMPERATURE: 99 F | DIASTOLIC BLOOD PRESSURE: 70 MMHG | OXYGEN SATURATION: 96 % | HEART RATE: 84 BPM

## 2019-03-21 DIAGNOSIS — J32.9 RHINOSINUSITIS: Primary | ICD-10-CM

## 2019-03-21 DIAGNOSIS — J11.1 INFLUENZA-LIKE ILLNESS: ICD-10-CM

## 2019-03-21 DIAGNOSIS — J31.0 RHINOSINUSITIS: Primary | ICD-10-CM

## 2019-03-21 PROCEDURE — 99213 OFFICE O/P EST LOW 20 MIN: CPT | Performed by: NURSE PRACTITIONER

## 2019-03-21 RX ORDER — AMOXICILLIN AND CLAVULANATE POTASSIUM 875; 125 MG/1; MG/1
1 TABLET, FILM COATED ORAL 2 TIMES DAILY
Qty: 20 TABLET | Refills: 0 | Status: SHIPPED | OUTPATIENT
Start: 2019-03-21 | End: 2019-03-31

## 2019-03-21 NOTE — PROGRESS NOTES
CHIEF COMPLAINT:   Patient presents with:  Sinus Problem: Continue sinus Sxs since Feb.  Also new sore throat, body aches, fever, worsening cough over the past 2-3 days.       HPI:   Debbie Rodriguez is a 77year old female who presents for continue sin Procedure Laterality Date   • CATARACT Bilateral 1998   • LUMPECTOMY LEFT           Social History    Tobacco Use      Smoking status: Never Smoker      Smokeless tobacco: Never Used    Alcohol use: No      Alcohol/week: 0.0 oz    Drug use: No        REVIE - Pt declines testing for influenza or strep. Advised influenza is likely given these new Sxs. Pt slightly out of Tamiflu window but still offered Tamiflu given >70 y/o. Pt declines. - Comfort care as described in Patient Instructions.   May continue Co · Heat may help soothe painful areas of your face. Use a towel soaked in hot water. Or,  the shower and direct the warm spray onto your face.  Using a vaporizer along with a menthol rub at night may also help soothe symptoms.   · An expectorant with · Swelling of your forehead or eyelids  · Vision problems, such as blurred or double vision  · Fever of 100.4ºF (38ºC) or higher, or as directed by your healthcare provider  · Seizure  · Breathing problems  · Symptoms don't go away in 10 days  Prevention The flu is caused by a virus. The virus spreads through the air in droplets when someone who has the flu coughs, sneezes, laughs, or talks. You can become infected when you inhale these viruses directly.  You can also become infected when you touch a surfac · Wash your hands often, especially after coughing or sneezing. Or clean your hands with an alcohol-based hand  containing at least 60% alcohol. · Cough or sneeze into a tissue. Then throw the tissue away and wash your hands.  If you don’t have a ti · Use warm water and plenty of soap. Rub your hands together well. · Clean the whole hand, including under your nails, between your fingers, and up the wrists. · Wash for at least 15 seconds.   · Rinse, letting the water run down your fingers, not up your

## 2019-03-21 NOTE — PATIENT INSTRUCTIONS
Sinusitis (Antibiotic Treatment)    The sinuses are air-filled spaces within the bones of the face. They connect to the inside of the nose. Sinusitis is an inflammation of the tissue that lines the sinuses. Sinusitis can occur during a cold.  It can also · Do not use nasal rinses or irrigation during an acute sinus infection, unless your healthcare provider tells you to. Rinsing may spread the infection to other areas in your sinuses.   · Use acetaminophen or ibuprofen to control pain, unless another pain m The flu (influenza) is an infection that affects your respiratory tract. This tract is made up of your mouth, nose, and lungs, and the passages between them. Unlike a cold, the flu can make you very ill.  And it can lead to pneumonia, a serious lung infecti The flu usually gets better after 7 days or so. In some cases, your healthcare provider may prescribe an antiviral medicine. This may help you get well a little sooner.  For the medicine to help, you need to take it as soon as possible (ideally within 48 ho · One of the best ways to avoid the flu is to get a flu vaccine each year. The virus that causes the flu changes from year to year. For that reason, healthcare providers recommend getting the flu vaccine each year, as soon as it's available in your area.  Kaci Houser · Rub until the gel is gone and your hands are completely dry. Preventing the flu in healthcare settings  The flu is a special concern for people in hospitals and long-term care facilities.  To help prevent the spread of flu, many hospitals and nursing jason

## 2019-04-07 ENCOUNTER — LAB ENCOUNTER (OUTPATIENT)
Dept: LAB | Facility: HOSPITAL | Age: 69
End: 2019-04-07
Attending: INTERNAL MEDICINE
Payer: MEDICARE

## 2019-04-07 DIAGNOSIS — I10 ESSENTIAL HYPERTENSION: ICD-10-CM

## 2019-04-07 DIAGNOSIS — D70.9 NEUTROPENIA, UNSPECIFIED TYPE (HCC): ICD-10-CM

## 2019-04-07 PROCEDURE — 36415 COLL VENOUS BLD VENIPUNCTURE: CPT

## 2019-04-07 PROCEDURE — 81003 URINALYSIS AUTO W/O SCOPE: CPT

## 2019-04-07 PROCEDURE — 80061 LIPID PANEL: CPT

## 2019-04-07 PROCEDURE — 84443 ASSAY THYROID STIM HORMONE: CPT

## 2019-04-07 PROCEDURE — 85025 COMPLETE CBC W/AUTO DIFF WBC: CPT

## 2019-04-07 PROCEDURE — 80053 COMPREHEN METABOLIC PANEL: CPT

## 2019-04-16 ENCOUNTER — OFFICE VISIT (OUTPATIENT)
Dept: INTERNAL MEDICINE CLINIC | Facility: CLINIC | Age: 69
End: 2019-04-16
Payer: MEDICARE

## 2019-04-16 VITALS
DIASTOLIC BLOOD PRESSURE: 76 MMHG | WEIGHT: 164 LBS | HEART RATE: 64 BPM | RESPIRATION RATE: 16 BRPM | BODY MASS INDEX: 24.86 KG/M2 | SYSTOLIC BLOOD PRESSURE: 127 MMHG | HEIGHT: 68 IN

## 2019-04-16 DIAGNOSIS — R01.1 CARDIAC MURMUR, PREVIOUSLY UNDIAGNOSED: ICD-10-CM

## 2019-04-16 DIAGNOSIS — D70.9 NEUTROPENIA, UNSPECIFIED TYPE (HCC): ICD-10-CM

## 2019-04-16 DIAGNOSIS — I10 ESSENTIAL HYPERTENSION: Primary | ICD-10-CM

## 2019-04-16 PROCEDURE — G0463 HOSPITAL OUTPT CLINIC VISIT: HCPCS | Performed by: INTERNAL MEDICINE

## 2019-04-16 PROCEDURE — 99214 OFFICE O/P EST MOD 30 MIN: CPT | Performed by: INTERNAL MEDICINE

## 2019-04-17 NOTE — PROGRESS NOTES
HPI:    Patient ID: Gatito Smith is a 68year old female. Notes recorded by Luana Mejía MD on 4/16/2019 at 7:28 PM CDT  Labs as discussed-  Persistent mild neutropenia-decrease in the counts of neutrophils.   All the rest of the cell lines-red Negative. Negative for palpitations, orthopnea and PND. Gastrointestinal: Negative. Endocrine: Negative. Genitourinary: Negative. Musculoskeletal: Negative. Negative for neck pain. Skin: Negative. Allergic/Immunologic: Negative.     Neurol Neurological: She is alert and oriented to person, place, and time. She has normal reflexes. No cranial nerve deficit. She exhibits normal muscle tone. Coordination normal.   Skin: No rash noted. No erythema.    Psychiatric: She has a normal mood and affe [E]      Comp Metabolic Panel (14) [E]      Lipid Panel [E]      Meds This Visit:  Requested Prescriptions      No prescriptions requested or ordered in this encounter       Imaging & Referrals:  CARD ECHO 2D DOPPLER (CPT=93306)       IK#[id number]

## 2019-04-17 NOTE — ASSESSMENT & PLAN NOTE
Blood pressure 127/76, pulse 64, resp. rate 16, height 5' 8\" (1.727 m), weight 164 lb (74.4 kg), not currently breastfeeding. Blood pressure looks great at this time.   Kidney functions look normal.  Potassium levels look normal and EGFR looks normal.

## 2019-04-17 NOTE — PATIENT INSTRUCTIONS
Problem List Items Addressed This Visit        Unprioritized    Hypertension - Primary     Blood pressure 127/76, pulse 64, resp. rate 16, height 5' 8\" (1.727 m), weight 164 lb (74.4 kg), not currently breastfeeding.      Blood pressure looks great at this

## 2019-04-17 NOTE — ASSESSMENT & PLAN NOTE
White cell count–neutrophils slightly low. Patient has been having undulating levels over the past few years. She has not been symptomatic. Continue to monitor at this time.   All the other cell lines–platelets and the red cells as well as the rest of th

## 2019-05-04 ENCOUNTER — HOSPITAL ENCOUNTER (OUTPATIENT)
Dept: CV DIAGNOSTICS | Facility: HOSPITAL | Age: 69
Discharge: HOME | End: 2019-05-04
Attending: INTERNAL MEDICINE
Payer: MEDICARE

## 2019-05-04 DIAGNOSIS — I10 ESSENTIAL HYPERTENSION: ICD-10-CM

## 2019-05-04 DIAGNOSIS — R01.1 CARDIAC MURMUR, PREVIOUSLY UNDIAGNOSED: ICD-10-CM

## 2019-05-04 PROCEDURE — 93306 TTE W/DOPPLER COMPLETE: CPT | Performed by: INTERNAL MEDICINE

## 2019-05-07 ENCOUNTER — HOSPITAL ENCOUNTER (OUTPATIENT)
Dept: MAMMOGRAPHY | Facility: HOSPITAL | Age: 69
Discharge: HOME | End: 2019-05-07
Attending: INTERNAL MEDICINE
Payer: MEDICARE

## 2019-05-07 DIAGNOSIS — R92.2 DENSE BREAST TISSUE ON MAMMOGRAM: ICD-10-CM

## 2019-05-07 PROCEDURE — 77067 SCR MAMMO BI INCL CAD: CPT | Performed by: INTERNAL MEDICINE

## 2019-05-07 PROCEDURE — 77063 BREAST TOMOSYNTHESIS BI: CPT | Performed by: INTERNAL MEDICINE

## 2019-05-17 DIAGNOSIS — I10 ESSENTIAL HYPERTENSION: ICD-10-CM

## 2019-05-17 RX ORDER — LOSARTAN POTASSIUM 50 MG/1
TABLET ORAL
Qty: 90 TABLET | Refills: 1 | Status: SHIPPED | OUTPATIENT
Start: 2019-05-17 | End: 2019-11-01

## 2019-05-17 RX ORDER — HYDROCHLOROTHIAZIDE 12.5 MG/1
12.5 TABLET ORAL DAILY
Qty: 90 TABLET | Refills: 1 | Status: SHIPPED | OUTPATIENT
Start: 2019-05-17 | End: 2019-11-01

## 2019-05-29 ENCOUNTER — OFFICE VISIT (OUTPATIENT)
Dept: OPHTHALMOLOGY | Facility: CLINIC | Age: 69
End: 2019-05-29
Payer: MEDICARE

## 2019-05-29 DIAGNOSIS — H02.883 MEIBOMIAN GLAND DYSFUNCTION (MGD) OF BOTH EYES: ICD-10-CM

## 2019-05-29 DIAGNOSIS — H43.393 VITREOUS FLOATERS OF BOTH EYES: ICD-10-CM

## 2019-05-29 DIAGNOSIS — H40.003 GLAUCOMA SUSPECT OF BOTH EYES: Primary | ICD-10-CM

## 2019-05-29 DIAGNOSIS — H02.886 MEIBOMIAN GLAND DYSFUNCTION (MGD) OF BOTH EYES: ICD-10-CM

## 2019-05-29 DIAGNOSIS — H25.13 AGE-RELATED NUCLEAR CATARACT OF BOTH EYES: ICD-10-CM

## 2019-05-29 PROCEDURE — 92014 COMPRE OPH EXAM EST PT 1/>: CPT | Performed by: OPHTHALMOLOGY

## 2019-05-29 PROCEDURE — 92250 FUNDUS PHOTOGRAPHY W/I&R: CPT | Performed by: OPHTHALMOLOGY

## 2019-05-29 PROCEDURE — 92015 DETERMINE REFRACTIVE STATE: CPT | Performed by: OPHTHALMOLOGY

## 2019-05-29 NOTE — PROGRESS NOTES
Ann Giles is a 68year old female. HPI:     HPI     Patient is here for a complete exam and photos. She complains of a film over her left eye, which goes away when she blinks. Patient states her vision is good. She denies blurry vision.   Roge Vieyra 553/-1          Pupils       Pupils    Right PERRL    Left PERRL          Visual Fields       Left Right     Full Full          Extraocular Movement       Right Left     Full, Ortho Full, Ortho          Dilation     Both eyes:  zora x2 @ 9:20 AM New glasses today; update as needed. Discussed that new prescription is only a slight change. Vitreous floaters of right eye  No treatment.      Meibomian gland dysfunction (MGD) of both eyes  Use warm compresses twice a day to keep her lids and la

## 2019-05-29 NOTE — PATIENT INSTRUCTIONS
Glaucoma suspect of both eyes  Patient is a glaucoma suspect due to increased cupping of the optic nerves in both eyes. IOP is normal today. Optic nerves are stable. There is no diagnosis of glaucoma at this time.  We will have patient back for next avail

## 2019-05-29 NOTE — ASSESSMENT & PLAN NOTE
Patient is a glaucoma suspect due to increased cupping of the optic nerves in both eyes. IOP is normal today. Optic nerves are stable. There is no diagnosis of glaucoma at this time.  We will have patient back for next available VF and OCT with no MD.

## 2019-06-13 ENCOUNTER — OFFICE VISIT (OUTPATIENT)
Dept: INTERNAL MEDICINE CLINIC | Facility: CLINIC | Age: 69
End: 2019-06-13
Payer: MEDICARE

## 2019-06-13 VITALS
DIASTOLIC BLOOD PRESSURE: 70 MMHG | BODY MASS INDEX: 26.23 KG/M2 | RESPIRATION RATE: 18 BRPM | SYSTOLIC BLOOD PRESSURE: 118 MMHG | WEIGHT: 167.13 LBS | HEART RATE: 60 BPM | TEMPERATURE: 99 F | HEIGHT: 67 IN

## 2019-06-13 DIAGNOSIS — Z85.3 HISTORY OF LEFT BREAST CANCER: ICD-10-CM

## 2019-06-13 DIAGNOSIS — D70.9 NEUTROPENIA, UNSPECIFIED TYPE (HCC): ICD-10-CM

## 2019-06-13 DIAGNOSIS — H25.13 AGE-RELATED NUCLEAR CATARACT OF BOTH EYES: ICD-10-CM

## 2019-06-13 DIAGNOSIS — Z00.00 MEDICARE ANNUAL WELLNESS VISIT, SUBSEQUENT: ICD-10-CM

## 2019-06-13 DIAGNOSIS — I10 ESSENTIAL HYPERTENSION: Primary | ICD-10-CM

## 2019-06-13 DIAGNOSIS — Z00.00 ENCOUNTER FOR ANNUAL HEALTH EXAMINATION: ICD-10-CM

## 2019-06-13 DIAGNOSIS — H40.003 GLAUCOMA SUSPECT OF BOTH EYES: ICD-10-CM

## 2019-06-13 DIAGNOSIS — Z12.11 SCREENING FOR MALIGNANT NEOPLASM OF COLON: ICD-10-CM

## 2019-06-13 PROBLEM — H02.883 MEIBOMIAN GLAND DYSFUNCTION (MGD) OF BOTH EYES: Status: RESOLVED | Noted: 2019-05-29 | Resolved: 2019-06-13

## 2019-06-13 PROBLEM — H02.886 MEIBOMIAN GLAND DYSFUNCTION (MGD) OF BOTH EYES: Status: RESOLVED | Noted: 2019-05-29 | Resolved: 2019-06-13

## 2019-06-13 PROBLEM — H43.391 VITREOUS FLOATERS OF RIGHT EYE: Status: RESOLVED | Noted: 2018-04-05 | Resolved: 2019-06-13

## 2019-06-13 PROBLEM — H57.9 ALLERGIC EYE REACTION: Status: RESOLVED | Noted: 2018-05-15 | Resolved: 2019-06-13

## 2019-06-13 PROCEDURE — G0439 PPPS, SUBSEQ VISIT: HCPCS | Performed by: INTERNAL MEDICINE

## 2019-06-13 PROCEDURE — 96160 PT-FOCUSED HLTH RISK ASSMT: CPT | Performed by: INTERNAL MEDICINE

## 2019-06-13 NOTE — ASSESSMENT & PLAN NOTE
Blood pressure 118/70, pulse 60, temperature 98.7 °F (37.1 °C), temperature source Oral, resp. rate 18, height 5' 7\" (1.702 m), weight 167 lb 1.6 oz (75.8 kg), not currently breastfeeding.   Stable blood pressure, well controlled on losartan at 50 mg daily

## 2019-06-13 NOTE — ASSESSMENT & PLAN NOTE
Intermittent stable, asymptomatic neutropenia. Continue to monitor. No hepatosplenomegaly, sinus infections. Has had some allergy symptoms at this time with nasal congestion and stuffiness.   Advised to take an over-the-counter Claritin or Zyrtec 10 mg d

## 2019-06-13 NOTE — PATIENT INSTRUCTIONS
Problem List Items Addressed This Visit        Unprioritized    Age-related nuclear cataract of both eyes     Bilateral cataracts–being monitored at this time. Recheck in 1 year.          Glaucoma suspect of both eyes     Stable glaucoma, monitored per oph abnormalities. No cystocele, rectocele or uterine descent. Pap completed last year.     Immunizations-    Immunization History   Administered Date(s) Administered   • FLU VACC High Dose 65 YRS & Older PRSV Free (02738) 10/01/2016   • FLUAD High Dose 72 yr intervals for prediabetic patients        Cardiovascular Disease Screening     Cholesterol, covered every 5 yrs including Total, LDL and Trigs LDL Cholesterol (mg/dL)   Date Value   04/07/2019 108 (H)     Cholesterol, Total (mg/dL)   Date Value   04/07/201 for medical condition    Last Dexa Scan:   XR DEXA BONE DENSITOMETRY (CPT=77080) 04/04/2018    No flowsheet data found.     Recommended for 2 year anniversary or as ordered in After Visit Summary   Pap and Pelvic      Pap: Every 3 yrs age 21-65 or Pap+HPV e may be covered with your pharmacy  prescription benefits     Recommended Websites for Advanced Directives    [email or web address]  An information packet, including necessary form from the [facility]

## 2019-06-13 NOTE — PROGRESS NOTES
HPI:   Alessandro Mullins is a 68year old female who presents for a Medicare Subsequent Annual Wellness visit (Pt already had Initial Annual Wellness).       Her last annual assessment has been over 1 year: Annual Physical due on 03/29/2019         [facility] (Internal Medicine)    Patient Active Problem List:     Medicare annual wellness visit, subsequent     Menopause     Hypertension     History of left breast cancer     Neutropenia ([facility] [address]     Dense breast tissue on mammogram     Age-related nuclear cataract onset: 60) in her self; Cataracts in her father and mother. SOCIAL HISTORY:   She  reports that she has never smoked. She has never used smokeless tobacco. She reports that she does not drink alcohol or use drugs.      REVIEW OF SYSTEMS:   Review of Syste ask people to repeat themselves:  No   I especially have trouble understanding the speech of women and children:  No I have trouble understanding the speaker in a large room such as at a meeting or place of Lutheran:  No   Many people I talk to seem to mumb There is no hepatosplenomegaly. There is no tenderness. There is no rebound and no guarding. No hernia. Hernia confirmed negative in the right inguinal area and confirmed negative in the left inguinal area.    Genitourinary: Rectum normal. Rectal exam shows Assessment. Problem List Items Addressed This Visit        Unprioritized    Age-related nuclear cataract of both eyes     Bilateral cataracts–being monitored at this time. Recheck in 1 year.          Glaucoma suspect of both eyes     Stable glaucoma, m palpable abnormalities. No cystocele, rectocele or uterine descent. Pap completed last year.     Immunizations-    Immunization History   Administered Date(s) Administered   • FLU VACC High Dose 65 YRS & Older PRSV Free (42411) 10/01/2016   • FLUAD High D would you describe your daily physical activity?: (P) Heavy  How would you describe your current health state?: (P) Good  How do you maintain positive mental well-being?: (P) Social Interaction;Puzzles;Games; Visiting Friends; Visiting Family      This secti Immunizations (Update Immunization Activity if applicable)     Influenza  Covered Annually 10/18/2018 Please get every year    Pneumococcal 15 (Prevnar)  Covered Once after 65 02/17/2015 Please get once after your 65th birthday    Pneumococcal 23 (Pneu

## 2019-06-13 NOTE — ASSESSMENT & PLAN NOTE
Patient is status post left breast lumpectomy about 10 years back. Mammogram–tomosynthesis has been stable. Continue to monitor.

## 2019-06-15 ENCOUNTER — NURSE ONLY (OUTPATIENT)
Dept: OPHTHALMOLOGY | Facility: CLINIC | Age: 69
End: 2019-06-15
Payer: MEDICARE

## 2019-06-15 DIAGNOSIS — H40.003 GLAUCOMA SUSPECT OF BOTH EYES: ICD-10-CM

## 2019-06-15 PROCEDURE — 92083 EXTENDED VISUAL FIELD XM: CPT | Performed by: OPHTHALMOLOGY

## 2019-06-15 PROCEDURE — 92133 CPTRZD OPH DX IMG PST SGM ON: CPT | Performed by: OPHTHALMOLOGY

## 2019-06-18 NOTE — PROGRESS NOTES
Lavern Perez is a 68year old female. HPI:     HPI     Patient is here for a glaucoma work up with HVF and OCT, ysisel LOCKETT     Last edited by Davonte Ruiz on 6/15/2019  8:14 AM. (History)        Patient History:  Past Medical History:   Diagnosis

## 2019-10-21 ENCOUNTER — LAB ENCOUNTER (OUTPATIENT)
Dept: LAB | Facility: HOSPITAL | Age: 69
End: 2019-10-21
Attending: INTERNAL MEDICINE
Payer: MEDICARE

## 2019-10-21 DIAGNOSIS — I10 ESSENTIAL HYPERTENSION: ICD-10-CM

## 2019-10-21 PROCEDURE — 85025 COMPLETE CBC W/AUTO DIFF WBC: CPT

## 2019-10-21 PROCEDURE — 80053 COMPREHEN METABOLIC PANEL: CPT

## 2019-10-21 PROCEDURE — 80061 LIPID PANEL: CPT

## 2019-10-21 PROCEDURE — 36415 COLL VENOUS BLD VENIPUNCTURE: CPT

## 2019-10-29 ENCOUNTER — OFFICE VISIT (OUTPATIENT)
Dept: INTERNAL MEDICINE CLINIC | Facility: CLINIC | Age: 69
End: 2019-10-29
Payer: MEDICARE

## 2019-10-29 VITALS
HEART RATE: 55 BPM | BODY MASS INDEX: 26.37 KG/M2 | DIASTOLIC BLOOD PRESSURE: 70 MMHG | HEIGHT: 67 IN | WEIGHT: 168 LBS | SYSTOLIC BLOOD PRESSURE: 120 MMHG | RESPIRATION RATE: 16 BRPM

## 2019-10-29 DIAGNOSIS — I10 ESSENTIAL HYPERTENSION: Primary | ICD-10-CM

## 2019-10-29 DIAGNOSIS — Z12.31 VISIT FOR SCREENING MAMMOGRAM: ICD-10-CM

## 2019-10-29 DIAGNOSIS — H60.501 ACUTE OTITIS EXTERNA OF RIGHT EAR, UNSPECIFIED TYPE: ICD-10-CM

## 2019-10-29 DIAGNOSIS — Z78.0 MENOPAUSE: ICD-10-CM

## 2019-10-29 PROBLEM — H60.509 ACUTE OTITIS EXTERNA: Status: ACTIVE | Noted: 2019-10-29

## 2019-10-29 PROCEDURE — 99214 OFFICE O/P EST MOD 30 MIN: CPT | Performed by: INTERNAL MEDICINE

## 2019-10-29 RX ORDER — NEOMYCIN SULFATE, POLYMYXIN B SULFATE AND HYDROCORTISONE 10; 3.5; 1 MG/ML; MG/ML; [USP'U]/ML
2 SUSPENSION/ DROPS AURICULAR (OTIC) 2 TIMES DAILY
Qty: 1 BOTTLE | Refills: 0 | Status: SHIPPED | OUTPATIENT
Start: 2019-10-29 | End: 2020-06-16 | Stop reason: ALTCHOICE

## 2019-10-29 RX ORDER — AZITHROMYCIN 250 MG/1
TABLET, FILM COATED ORAL
Qty: 6 TABLET | Refills: 0 | Status: SHIPPED | OUTPATIENT
Start: 2019-10-29 | End: 2020-04-15

## 2019-10-29 NOTE — ASSESSMENT & PLAN NOTE
Ear pain and discomfort off and on for a few months. On examination she has mild otitis externa of the right ear. Started on Z-Bill as directed as well as Cortisporin eardrops 2 drops to the affected ear twice daily for about 5 to 7 days.   Call if not bet

## 2019-10-29 NOTE — PROGRESS NOTES
HPI:    Patient ID: Ne Lopez is a 68year old female. Notes recorded by Misti Montana MD on 10/29/2019 at 5:40 PM CDT  Counts look normal-no anemia.   The kidney functions, liver functions and electrolytes look normal.  The cholesterol panel Negative. Psychiatric/Behavioral: Negative. Current Outpatient Medications   Medication Sig Dispense Refill   • azithromycin 250 MG Oral Tab Take two tablets by mouth today, then one daily.  6 tablet 0   • Neomycin-Polymyxin-HC 3.5-34749-1 Ot Musculoskeletal: Normal range of motion. General: Edema present. No tenderness. Lymphadenopathy:     She has no cervical adenopathy. Neurological: She is alert and oriented to person, place, and time. She has normal reflexes.  No cranial nerve AGREES TO FOLLOW DIRECTIONS AND ADVICE    Orders Placed This Encounter      CBC With Differential With Platelet      Comp Metabolic Panel (14)      Lipid Panel      Assay, Thyroid Stim Hormone      Urinalysis, Routine      Vitamin B12      Meds This Visit:

## 2019-10-29 NOTE — PATIENT INSTRUCTIONS
Problem List Items Addressed This Visit        Unprioritized    Acute otitis externa     Ear pain and discomfort off and on for a few months. On examination she has mild otitis externa of the right ear.   Started on Z-Bill as directed as well as Cortisporin

## 2019-10-29 NOTE — ASSESSMENT & PLAN NOTE
Blood pressure 120/70, pulse 55, resp. rate 16, height 5' 7\" (1.702 m), weight 168 lb (76.2 kg), not currently breastfeeding. Blood pressure stable upon recheck. Continue on losartan at 50 mg daily and hydrochlorothiazide 12.5 mg daily.   Recent labs

## 2019-11-01 DIAGNOSIS — I10 ESSENTIAL HYPERTENSION: ICD-10-CM

## 2019-11-01 RX ORDER — LOSARTAN POTASSIUM 50 MG/1
TABLET ORAL
Qty: 90 TABLET | Refills: 1 | Status: SHIPPED | OUTPATIENT
Start: 2019-11-01 | End: 2020-05-11

## 2019-11-01 RX ORDER — HYDROCHLOROTHIAZIDE 12.5 MG/1
TABLET ORAL
Qty: 90 TABLET | Refills: 1 | Status: SHIPPED | OUTPATIENT
Start: 2019-11-01 | End: 2020-05-11

## 2019-11-01 NOTE — TELEPHONE ENCOUNTER
Refill passed per [facility] protocol.     Hypertensive Medications  Protocol Criteria:  · Appointment scheduled in the past 6 months or in the next 3 months  · BMP or CMP in the past 12 months  · Creatinine result < 2  Recent Outpatient Visits

## 2020-04-15 ENCOUNTER — NURSE TRIAGE (OUTPATIENT)
Dept: INTERNAL MEDICINE CLINIC | Facility: CLINIC | Age: 70
End: 2020-04-15

## 2020-04-15 DIAGNOSIS — J02.9 PHARYNGITIS, UNSPECIFIED ETIOLOGY: Primary | ICD-10-CM

## 2020-04-15 PROCEDURE — 99441 PHONE E/M BY PHYS 5-10 MIN: CPT | Performed by: INTERNAL MEDICINE

## 2020-04-15 RX ORDER — AZITHROMYCIN 250 MG/1
TABLET, FILM COATED ORAL
Qty: 6 TABLET | Refills: 0 | Status: SHIPPED | OUTPATIENT
Start: 2020-04-15 | End: 2020-04-20

## 2020-04-15 RX ORDER — LEVOCETIRIZINE DIHYDROCHLORIDE 5 MG/1
5 TABLET, FILM COATED ORAL EVERY EVENING
Qty: 30 TABLET | Refills: 3 | Status: SHIPPED | OUTPATIENT
Start: 2020-04-15 | End: 2020-08-10

## 2020-04-15 NOTE — TELEPHONE ENCOUNTER
Action Requested: Summary for Provider     []  Critical Lab, Recommendations Needed  [] Need Additional Advice  []   FYI    []   Need Orders  [x] Need Medications Sent to Pharmacy  []  Other     SUMMARY: Protocol disposition states office visit today is ap

## 2020-04-16 NOTE — TELEPHONE ENCOUNTER
Virtual Telephone Check-In    Ann Giles verbally consents to a Virtual/Telephone Check-In visit on 04/15/20.     Patient understands and accepts financial responsibility for any deductible, co-insurance and/or co-pays associated with this service

## 2020-04-16 NOTE — ASSESSMENT & PLAN NOTE
Sore throat with nasal congestion, postnasal drip for the past 5 weeks. No associated cough, fevers or chills, fatigue, hoarseness. Has had some earache bilaterally.   Took over-the-counter Coricidin as well as old eardrops without any significant resolut

## 2020-05-03 ENCOUNTER — LAB ENCOUNTER (OUTPATIENT)
Dept: LAB | Facility: HOSPITAL | Age: 70
End: 2020-05-03
Attending: INTERNAL MEDICINE
Payer: MEDICARE

## 2020-05-03 DIAGNOSIS — I10 ESSENTIAL HYPERTENSION: ICD-10-CM

## 2020-05-03 PROCEDURE — 36415 COLL VENOUS BLD VENIPUNCTURE: CPT

## 2020-05-03 PROCEDURE — 85025 COMPLETE CBC W/AUTO DIFF WBC: CPT

## 2020-05-03 PROCEDURE — 84443 ASSAY THYROID STIM HORMONE: CPT

## 2020-05-03 PROCEDURE — 80053 COMPREHEN METABOLIC PANEL: CPT

## 2020-05-03 PROCEDURE — 82607 VITAMIN B-12: CPT

## 2020-05-03 PROCEDURE — 81001 URINALYSIS AUTO W/SCOPE: CPT

## 2020-05-03 PROCEDURE — 80061 LIPID PANEL: CPT

## 2020-05-07 ENCOUNTER — VIRTUAL PHONE E/M (OUTPATIENT)
Dept: INTERNAL MEDICINE CLINIC | Facility: CLINIC | Age: 70
End: 2020-05-07
Payer: MEDICARE

## 2020-05-07 VITALS
DIASTOLIC BLOOD PRESSURE: 68 MMHG | BODY MASS INDEX: 26 KG/M2 | SYSTOLIC BLOOD PRESSURE: 137 MMHG | HEART RATE: 58 BPM | WEIGHT: 169 LBS

## 2020-05-07 DIAGNOSIS — I10 ESSENTIAL HYPERTENSION: Primary | ICD-10-CM

## 2020-05-07 DIAGNOSIS — D70.9 NEUTROPENIA, UNSPECIFIED TYPE (HCC): ICD-10-CM

## 2020-05-07 DIAGNOSIS — J02.9 PHARYNGITIS, UNSPECIFIED ETIOLOGY: ICD-10-CM

## 2020-05-07 PROCEDURE — 99443 PHONE E/M BY PHYS 21-30 MIN: CPT | Performed by: INTERNAL MEDICINE

## 2020-05-07 NOTE — ASSESSMENT & PLAN NOTE
Recurrent, persistent sore throat–unchanged even with Z-Bill and treatment–April 15. Symptoms began around [place] time and has continued. She does have allergies and some postnasal drip and has been on Xyzal at this time.   She does have persistent neutrop

## 2020-05-07 NOTE — PATIENT INSTRUCTIONS
Problem List Items Addressed This Visit        Unprioritized    Hypertension - Primary     Blood pressure 137/68, pulse 58, weight 169 lb (76.7 kg), not currently breastfeeding.   Blood pressure seems stable, very well controlled at this time on losartan 50

## 2020-05-07 NOTE — PROGRESS NOTES
HPI:    Patient ID: Sanchez Mabry is a 65year old female. Virtual/Telephone Check-In    Sanchez Mabry verbally consents to a [employer] on 05/07/20.   Patient understands and accepts financial responsibility for any d levels look normal.  Hypertension   This is a chronic problem. The problem has been gradually worsening (Patient has been on losartan at 25 mg once a day, tolerating it well.   Monitors blood pressure at home and usually is between 120-130/70-80.) since ons Negative for malaise/fatigue. HENT: Positive for congestion (and pnd and has been on xyzal) and sore throat. Negative for drooling, ear pain, hoarse voice and trouble swallowing. Eyes: Negative. Respiratory: Negative.   Negative for cough and shortn and oriented to person, place, and time. Skin: No rash noted. Psychiatric: She has a normal mood and affect. Her behavior is normal. Judgment and thought content normal.   Vitals reviewed.              ASSESSMENT/PLAN:     Problem List Items Addressed T UNDERSTANDS AND AGREES TO FOLLOW DIRECTIONS AND ADVICE    No orders of the defined types were placed in this encounter.       Meds This Visit:  Requested Prescriptions      No prescriptions requested or ordered in this encounter       Imaging & Referrals:

## 2020-05-07 NOTE — ASSESSMENT & PLAN NOTE
Blood pressure 137/68, pulse 58, weight 169 lb (76.7 kg), not currently breastfeeding. Blood pressure seems stable, very well controlled at this time on losartan 50 mg daily and hydrochlorothiazide 12.5 mg daily. She has tolerated her medications well.

## 2020-05-07 NOTE — ASSESSMENT & PLAN NOTE
Intermittent stable, asymptomatic neutropenia, recent much lower counts with some symptoms of persistent sore throat. Hematology evaluation is being obtained. We will follow-up after completion.

## 2020-05-11 ENCOUNTER — OFFICE VISIT (OUTPATIENT)
Dept: HEMATOLOGY/ONCOLOGY | Facility: HOSPITAL | Age: 70
End: 2020-05-11
Attending: INTERNAL MEDICINE
Payer: MEDICARE

## 2020-05-11 VITALS
OXYGEN SATURATION: 97 % | WEIGHT: 175 LBS | HEART RATE: 73 BPM | RESPIRATION RATE: 16 BRPM | HEIGHT: 67 IN | BODY MASS INDEX: 27.47 KG/M2 | DIASTOLIC BLOOD PRESSURE: 82 MMHG | SYSTOLIC BLOOD PRESSURE: 147 MMHG | TEMPERATURE: 97 F

## 2020-05-11 DIAGNOSIS — J02.9 PHARYNGITIS, UNSPECIFIED ETIOLOGY: ICD-10-CM

## 2020-05-11 DIAGNOSIS — Z85.3 HISTORY OF LEFT BREAST CANCER: ICD-10-CM

## 2020-05-11 DIAGNOSIS — I10 ESSENTIAL HYPERTENSION: ICD-10-CM

## 2020-05-11 DIAGNOSIS — D70.9 NEUTROPENIA, UNSPECIFIED TYPE (HCC): Primary | ICD-10-CM

## 2020-05-11 PROCEDURE — 99204 OFFICE O/P NEW MOD 45 MIN: CPT | Performed by: INTERNAL MEDICINE

## 2020-05-11 RX ORDER — LOSARTAN POTASSIUM 50 MG/1
TABLET ORAL
Qty: 90 TABLET | Refills: 0 | Status: SHIPPED | OUTPATIENT
Start: 2020-05-11 | End: 2020-08-14

## 2020-05-11 RX ORDER — HYDROCHLOROTHIAZIDE 12.5 MG/1
TABLET ORAL
Qty: 90 TABLET | Refills: 1 | Status: SHIPPED | OUTPATIENT
Start: 2020-05-11 | End: 2020-11-12

## 2020-05-11 NOTE — CONSULTS
Cancer Center History and Physical    Patient Name: Paola Howe   YOB: 1950   Medical Record Number: [id number]   CSN: [id number]   Attending Physician:  Tk Ewing MD       Date of Visit: 5/11/2020     Chief Complaint:  Neutropenia Dihydrochloride 5 MG Oral Tab, Take 1 tablet (5 mg total) by mouth every evening., Disp: 30 tablet, Rfl: 3  •  HYDROCHLOROTHIAZIDE 12.5 MG Oral Tab, TAKE ONE TABLET BY MOUTH ONE TIME DAILY , Disp: 90 tablet, Rfl: 1  •  LOSARTAN POTASSIUM 50 MG Oral Tab, TA 11/23/2018     Lab Results   Component Value Date     05/03/2020    K 4.3 05/03/2020     05/03/2020    CO2 32.0 05/03/2020    BUN 11 05/03/2020    CREATSERUM 0.73 05/03/2020    GLU 83 05/03/2020    CA 8.8 05/03/2020    ALKPHO 76 05/03/2020    A

## 2020-05-17 ENCOUNTER — LAB ENCOUNTER (OUTPATIENT)
Dept: LAB | Facility: HOSPITAL | Age: 70
End: 2020-05-17
Attending: INTERNAL MEDICINE
Payer: MEDICARE

## 2020-05-17 DIAGNOSIS — D70.9 NEUTROPENIA, UNSPECIFIED TYPE (HCC): ICD-10-CM

## 2020-05-17 PROCEDURE — 87340 HEPATITIS B SURFACE AG IA: CPT

## 2020-05-17 PROCEDURE — 83883 ASSAY NEPHELOMETRY NOT SPEC: CPT

## 2020-05-17 PROCEDURE — 83615 LACTATE (LD) (LDH) ENZYME: CPT

## 2020-05-17 PROCEDURE — 86803 HEPATITIS C AB TEST: CPT

## 2020-05-17 PROCEDURE — 84443 ASSAY THYROID STIM HORMONE: CPT

## 2020-05-17 PROCEDURE — 87389 HIV-1 AG W/HIV-1&-2 AB AG IA: CPT

## 2020-05-17 PROCEDURE — 86334 IMMUNOFIX E-PHORESIS SERUM: CPT

## 2020-05-17 PROCEDURE — 80500 HEPATITIS A B + C PROFILE: CPT

## 2020-05-17 PROCEDURE — 82784 ASSAY IGA/IGD/IGG/IGM EACH: CPT

## 2020-05-17 PROCEDURE — 85025 COMPLETE CBC W/AUTO DIFF WBC: CPT

## 2020-05-17 PROCEDURE — 36415 COLL VENOUS BLD VENIPUNCTURE: CPT

## 2020-05-17 PROCEDURE — 86706 HEP B SURFACE ANTIBODY: CPT

## 2020-05-17 PROCEDURE — 86704 HEP B CORE ANTIBODY TOTAL: CPT

## 2020-05-17 PROCEDURE — 86708 HEPATITIS A ANTIBODY: CPT

## 2020-05-17 PROCEDURE — 84165 PROTEIN E-PHORESIS SERUM: CPT

## 2020-06-04 ENCOUNTER — HOSPITAL ENCOUNTER (OUTPATIENT)
Dept: MAMMOGRAPHY | Facility: HOSPITAL | Age: 70
Discharge: HOME | End: 2020-06-04
Attending: INTERNAL MEDICINE
Payer: MEDICARE

## 2020-06-04 ENCOUNTER — HOSPITAL ENCOUNTER (OUTPATIENT)
Dept: BONE DENSITY | Facility: HOSPITAL | Age: 70
Discharge: HOME | End: 2020-06-04
Attending: INTERNAL MEDICINE
Payer: MEDICARE

## 2020-06-04 DIAGNOSIS — Z78.0 MENOPAUSE: ICD-10-CM

## 2020-06-04 DIAGNOSIS — Z12.31 VISIT FOR SCREENING MAMMOGRAM: ICD-10-CM

## 2020-06-04 PROCEDURE — 77063 BREAST TOMOSYNTHESIS BI: CPT | Performed by: INTERNAL MEDICINE

## 2020-06-04 PROCEDURE — 77080 DXA BONE DENSITY AXIAL: CPT | Performed by: INTERNAL MEDICINE

## 2020-06-04 PROCEDURE — 77067 SCR MAMMO BI INCL CAD: CPT | Performed by: INTERNAL MEDICINE

## 2020-06-12 ENCOUNTER — HOSPITAL ENCOUNTER (OUTPATIENT)
Dept: MAMMOGRAPHY | Facility: HOSPITAL | Age: 70
Discharge: HOME | End: 2020-06-12
Attending: INTERNAL MEDICINE
Payer: MEDICARE

## 2020-06-12 DIAGNOSIS — R92.8 ABNORMAL MAMMOGRAM: ICD-10-CM

## 2020-06-12 PROCEDURE — 77065 DX MAMMO INCL CAD UNI: CPT | Performed by: INTERNAL MEDICINE

## 2020-06-12 PROCEDURE — 77061 BREAST TOMOSYNTHESIS UNI: CPT | Performed by: INTERNAL MEDICINE

## 2020-06-16 ENCOUNTER — MA CHART PREP (OUTPATIENT)
Dept: FAMILY MEDICINE CLINIC | Facility: CLINIC | Age: 70
End: 2020-06-16

## 2020-06-16 ENCOUNTER — OFFICE VISIT (OUTPATIENT)
Dept: INTERNAL MEDICINE CLINIC | Facility: CLINIC | Age: 70
End: 2020-06-16
Payer: MEDICARE

## 2020-06-16 VITALS
HEIGHT: 67 IN | BODY MASS INDEX: 27.17 KG/M2 | DIASTOLIC BLOOD PRESSURE: 78 MMHG | HEART RATE: 61 BPM | RESPIRATION RATE: 16 BRPM | SYSTOLIC BLOOD PRESSURE: 123 MMHG | WEIGHT: 173.13 LBS

## 2020-06-16 DIAGNOSIS — H40.003 GLAUCOMA SUSPECT OF BOTH EYES: ICD-10-CM

## 2020-06-16 DIAGNOSIS — Z00.00 ENCOUNTER FOR ANNUAL HEALTH EXAMINATION: ICD-10-CM

## 2020-06-16 DIAGNOSIS — I27.20 PULMONARY HTN (HCC): ICD-10-CM

## 2020-06-16 DIAGNOSIS — J70.1 RADIATION FIBROSIS OF LUNG (HCC): ICD-10-CM

## 2020-06-16 DIAGNOSIS — H25.13 AGE-RELATED NUCLEAR CATARACT OF BOTH EYES: ICD-10-CM

## 2020-06-16 DIAGNOSIS — Z12.11 COLON CANCER SCREENING: ICD-10-CM

## 2020-06-16 DIAGNOSIS — I10 ESSENTIAL HYPERTENSION: Primary | ICD-10-CM

## 2020-06-16 DIAGNOSIS — Z85.3 HISTORY OF LEFT BREAST CANCER: ICD-10-CM

## 2020-06-16 DIAGNOSIS — D22.9 ATYPICAL NEVI: ICD-10-CM

## 2020-06-16 DIAGNOSIS — D70.9 NEUTROPENIA, UNSPECIFIED TYPE (HCC): ICD-10-CM

## 2020-06-16 DIAGNOSIS — Z00.00 MEDICARE ANNUAL WELLNESS VISIT, SUBSEQUENT: ICD-10-CM

## 2020-06-16 PROBLEM — H60.509 ACUTE OTITIS EXTERNA: Status: RESOLVED | Noted: 2019-10-29 | Resolved: 2020-06-16

## 2020-06-16 PROBLEM — M85.859 OSTEOPENIA OF FEMORAL NECK: Status: ACTIVE | Noted: 2020-06-16

## 2020-06-16 PROBLEM — M47.816 FACET ARTHRITIS, DEGENERATIVE, LUMBAR SPINE: Status: ACTIVE | Noted: 2020-06-16

## 2020-06-16 PROBLEM — J02.9 PHARYNGITIS: Status: RESOLVED | Noted: 2020-04-15 | Resolved: 2020-06-16

## 2020-06-16 PROBLEM — I51.89 GRADE I DIASTOLIC DYSFUNCTION: Status: ACTIVE | Noted: 2020-06-16

## 2020-06-16 PROCEDURE — 99397 PER PM REEVAL EST PAT 65+ YR: CPT | Performed by: INTERNAL MEDICINE

## 2020-06-16 PROCEDURE — G0439 PPPS, SUBSEQ VISIT: HCPCS | Performed by: INTERNAL MEDICINE

## 2020-06-16 PROCEDURE — 96160 PT-FOCUSED HLTH RISK ASSMT: CPT | Performed by: INTERNAL MEDICINE

## 2020-06-16 NOTE — ASSESSMENT & PLAN NOTE
Blood pressure 123/78, pulse 61, resp. rate 16, height 5' 7\" (1.702 m), weight 173 lb 1.6 oz (78.5 kg), not currently breastfeeding. Stable blood pressure, well controlled with losartan 50 mg daily and hydrochlorothiazide 12.5 mg daily.   She has toby

## 2020-06-16 NOTE — ASSESSMENT & PLAN NOTE
Patient is status post left breast lumpectomy with left axillary dissection about 10 years back followed by radiation treatment. Mammogram–tomosynthesis has been stable. Continue to monitor.

## 2020-06-16 NOTE — ASSESSMENT & PLAN NOTE
Stable vision at this time. Normal visual fields bilaterally. Referral to Dr. Gonzales Nelson provided.

## 2020-06-16 NOTE — PATIENT INSTRUCTIONS
Problem List Items Addressed This Visit        Unprioritized    Age-related nuclear cataract of both eyes     Bilateral cataracts without visual compromise. Follow-up evaluation requested.          Glaucoma suspect of both eyes     Stable vision at this ti cervical movement tenderness, adnexal palpable abnormalities. No cystocele, rectocele or uterine descent.   Pap not indicated    Immunization History   Administered Date(s) Administered   • FLU VACC High Dose 65 YRS & Older PRSV Free (09026) 10/01/2016, 10 Screening      HbgA1C    At Least  Annually for Diabetics No results found for: A1C No flowsheet data found.     Fasting Blood Sugar (FSB)   Patient must be diagnosed with one of the following:   • Hypertension   • Dyslipidemia   • Obesity (BMI ³30 kg/m2) uncomfortable but covered  Covered but uncomfortable   Glaucoma Screening      Ophthalmology Visit   Covered annually for Diabetics, people with Glaucoma family history,   Americans over age 48   Americans over age 72 No flowsheet data fou Factor VIII or IX concentrates   Clients of institutions for the mentally retarded   Persons who live in the same house as a HepB virus carrier   Homosexual men   Illicit injectable drug abusers     Tetanus Toxoid- Only covered with a cut with metal- TD an

## 2020-06-16 NOTE — ASSESSMENT & PLAN NOTE
Mild intermittent neutropenia. Total white count is slightly low at this time. Advised to continue to monitor. Remains afebrile. Has been seen by hematology. No significant other abnormalities noted.

## 2020-06-16 NOTE — ASSESSMENT & PLAN NOTE
Normal exam.  Labs as ordered. Skin check normal.  Scattered nevi present, referral to dermatology provided–Dr. Dolly Kimbrough, [phone] for an appointment.   Vision and hearing exam normal.  Breast exam completed–no palpable abnormalities, discharge from the ni

## 2020-06-16 NOTE — PROGRESS NOTES
HPI:   Sanchez Mabry is a 65year old female who presents for a Medicare Subsequent Annual Wellness visit (Pt already had Initial Annual Wellness).       Her last annual assessment has been over 1 year: Annual Physical due on 06/13/2020         Keyshawn Kamara Pulmonary HTN ([facility] [address]    Wt Readings from Last 3 Encounters:  06/16/20 : 173 lb 1.6 oz (78.5 kg)  05/11/20 : 175 lb (79.4 kg)  05/07/20 : 169 lb (76.7 kg)     Last Cholesterol Labs:   Lab Results   Component Value Date    CHOLEST 185 05/03/2020    HDL 88 (H) vision  HEENT: denies nasal congestion, sinus pain or ST  LUNGS: denies shortness of breath with exertion  CARDIOVASCULAR: denies chest pain on exertion  GI: denies abdominal pain, denies heartburn  : denies dysuria, vaginal discharge or itching, no comp inappropriate responses:  No I avoid social activities because I cannot hear well and fear I will reply improperly:  No   Family members and friends have told me they think I may have hearing loss:   No                Visual Acuity  Right Eye Visual Acuity: Genitourinary:    External Gyn:  Pubic hair is normally distributed.  External genitalia is unremarkable. Glands do appear to be normal.  Perineum is unremarkable.  No perianal abnormalities.    Urethra is normal in appearance, without erythema.  Urethra me provided. Relevant Orders    OPHTHALMOLOGY - INTERNAL    History of left breast cancer     Patient is status post left breast lumpectomy with left axillary dissection about 10 years back followed by radiation treatment.   Mammogram–tomosynthesis has High Dose 65 yr and older (33498) 09/21/2017   • Influenza 10/18/2018   • Pneumococcal (Prevnar 13) 02/17/2015   • Pneumovax 23 09/21/2017   • TDAP 02/17/2015   Recommend shingles vaccine–Shingrix 2 doses, 2-4 months apart.              Neutropenia ([facility] [address] INTERNAL    Glaucoma suspect of both eyes  -     OPHTHALMOLOGY - INTERNAL    Radiation fibrosis of lung ([facility] [address]  -     COMPLETE PFT; Future  -     2019 NOVEL CORONAVIRUS SARS-COV-2 BY PCR([facility]);  Future    [facility]    Encounter for annual health ex flowsheet data found. Fecal Occult Blood Annually Occult Blood (no units)   Date Value   04/06/2018 Negative    No flowsheet data found.     Glaucoma Screening      Ophthalmology Visit Annually: Diabetics, FHx Glaucoma, AA>50, > 65 No flowsheet Internal Lab or Procedure External Lab or Procedure      Annual Monitoring of Persistent     Medications (ACE/ARB, digoxin diuretics, anticonvulsants.)    Potassium  Annually Potassium (mmol/L)   Date Value   05/03/2020 4.3     POTASSIUM (P) (mmol/L)   [place]asha[place]

## 2020-06-30 ENCOUNTER — LAB ENCOUNTER (OUTPATIENT)
Dept: LAB | Facility: HOSPITAL | Age: 70
End: 2020-06-30
Attending: INTERNAL MEDICINE
Payer: MEDICARE

## 2020-06-30 DIAGNOSIS — Z01.812 PRE-PROCEDURE LAB EXAM: Primary | ICD-10-CM

## 2020-07-01 LAB — SARS-COV-2 RNA RESP QL NAA+PROBE: NOT DETECTED

## 2020-07-02 ENCOUNTER — OFFICE VISIT (OUTPATIENT)
Dept: OPHTHALMOLOGY | Facility: CLINIC | Age: 70
End: 2020-07-02
Payer: MEDICARE

## 2020-07-02 DIAGNOSIS — H43.393 VITREOUS FLOATERS OF BOTH EYES: ICD-10-CM

## 2020-07-02 DIAGNOSIS — H25.13 AGE-RELATED NUCLEAR CATARACT OF BOTH EYES: Primary | ICD-10-CM

## 2020-07-02 DIAGNOSIS — H40.003 GLAUCOMA SUSPECT OF BOTH EYES: ICD-10-CM

## 2020-07-02 PROCEDURE — 92014 COMPRE OPH EXAM EST PT 1/>: CPT | Performed by: OPHTHALMOLOGY

## 2020-07-02 NOTE — PROGRESS NOTES
Paola Howe is a 65year old female. HPI:     HPI     Consult      Additional comments: Per Dr. Champ Shankar               Comments     Pt complains of occasional blurred vision in her left eye at distance over the past 3 months.   Pt has been using O Allergic/Imm, Heme/Lymph    Last edited by Salud Avila O.T. on 7/2/2020 10:30 AM. (History)          PHYSICAL EXAM:     Base Eye Exam     Visual Acuity (Snellen - Linear)       Right Left    Dist cc 20/25 -2 20/25 -2    Near cc 20/20 20/20    Correcti both eyes. Patient had normal glaucoma diagnostics last year. IOP is normal today. Optic nerves are stable. There is no diagnosis of glaucoma at this time, but will follow in 1 year for dilated eye exam and photos.       Vitreous floaters of both eyes  N

## 2020-07-02 NOTE — PATIENT INSTRUCTIONS
Age-related nuclear cataract of both eyes  Discussed early cataracts with patient. No treatment recommended at this time. Continue same glasses.        Glaucoma suspect of both eyes  Patient is a glaucoma suspect due to increased cupping of the optic ner

## 2020-07-03 ENCOUNTER — HOSPITAL ENCOUNTER (OUTPATIENT)
Dept: RESPIRATORY THERAPY | Facility: HOSPITAL | Age: 70
Discharge: HOME | End: 2020-07-03
Attending: INTERNAL MEDICINE
Payer: MEDICARE

## 2020-07-03 DIAGNOSIS — J70.1 RADIATION FIBROSIS OF LUNG (HCC): ICD-10-CM

## 2020-07-03 PROCEDURE — 94726 PLETHYSMOGRAPHY LUNG VOLUMES: CPT | Performed by: INTERNAL MEDICINE

## 2020-07-03 PROCEDURE — 94060 EVALUATION OF WHEEZING: CPT | Performed by: INTERNAL MEDICINE

## 2020-07-03 PROCEDURE — 94729 DIFFUSING CAPACITY: CPT | Performed by: INTERNAL MEDICINE

## 2020-07-13 NOTE — PROCEDURES
[address] [facility] 1950 MRN [id number]   Height  79 inches Age 65year old   Weight   173 lbs  Sex Female         Spirometry:   FEV1 2.11 L which is 88%  FEV1/FVC 74%  Significant bronchod

## 2020-07-13 NOTE — ADDENDUM NOTE
Encounter addended by: Marco Han MD on: 7/13/2020 3:59 PM   Actions taken: Clinical Note Signed, Charge Capture section accepted

## 2020-08-10 RX ORDER — LEVOCETIRIZINE DIHYDROCHLORIDE 5 MG/1
5 TABLET, FILM COATED ORAL EVERY EVENING
Qty: 90 TABLET | Refills: 1 | Status: SHIPPED | OUTPATIENT
Start: 2020-08-10 | End: 2021-02-06

## 2020-08-14 RX ORDER — LOSARTAN POTASSIUM 50 MG/1
50 TABLET ORAL DAILY
Qty: 90 TABLET | Refills: 0 | Status: SHIPPED | OUTPATIENT
Start: 2020-08-14 | End: 2020-11-09

## 2020-10-02 ENCOUNTER — TELEPHONE (OUTPATIENT)
Dept: INTERNAL MEDICINE CLINIC | Facility: CLINIC | Age: 70
End: 2020-10-02

## 2020-10-02 NOTE — TELEPHONE ENCOUNTER
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 Another  watches the kids on Wednesday and that 's mom tested positive for Covid. \"Should I get tested?  I have no fever and I'm a little

## 2020-10-05 NOTE — TELEPHONE ENCOUNTER
Spoke with the patient who reports she did get tested for COVID-19 on 10/3/20 at [address] She reports she is still waiting on the results. Patient reports other family members were also tested and they tested negative.  Patient reports the only sy

## 2020-10-31 ENCOUNTER — LAB ENCOUNTER (OUTPATIENT)
Dept: LAB | Facility: HOSPITAL | Age: 70
End: 2020-10-31
Attending: INTERNAL MEDICINE
Payer: MEDICARE

## 2020-10-31 DIAGNOSIS — I10 ESSENTIAL HYPERTENSION: ICD-10-CM

## 2020-10-31 DIAGNOSIS — D70.9 NEUTROPENIA, UNSPECIFIED TYPE (HCC): ICD-10-CM

## 2020-10-31 PROCEDURE — 80053 COMPREHEN METABOLIC PANEL: CPT

## 2020-10-31 PROCEDURE — 84443 ASSAY THYROID STIM HORMONE: CPT

## 2020-10-31 PROCEDURE — 82607 VITAMIN B-12: CPT

## 2020-10-31 PROCEDURE — 80061 LIPID PANEL: CPT

## 2020-10-31 PROCEDURE — 81001 URINALYSIS AUTO W/SCOPE: CPT

## 2020-10-31 PROCEDURE — 82746 ASSAY OF FOLIC ACID SERUM: CPT

## 2020-10-31 PROCEDURE — 85025 COMPLETE CBC W/AUTO DIFF WBC: CPT

## 2020-10-31 PROCEDURE — 36415 COLL VENOUS BLD VENIPUNCTURE: CPT

## 2020-10-31 PROCEDURE — 82306 VITAMIN D 25 HYDROXY: CPT

## 2020-11-05 ENCOUNTER — OFFICE VISIT (OUTPATIENT)
Dept: INTERNAL MEDICINE CLINIC | Facility: CLINIC | Age: 70
End: 2020-11-05
Payer: MEDICARE

## 2020-11-05 VITALS
HEIGHT: 67 IN | HEART RATE: 60 BPM | WEIGHT: 171 LBS | BODY MASS INDEX: 26.84 KG/M2 | DIASTOLIC BLOOD PRESSURE: 77 MMHG | SYSTOLIC BLOOD PRESSURE: 126 MMHG | TEMPERATURE: 99 F | RESPIRATION RATE: 16 BRPM

## 2020-11-05 DIAGNOSIS — I10 ESSENTIAL HYPERTENSION: Primary | ICD-10-CM

## 2020-11-05 DIAGNOSIS — I27.20 PULMONARY HTN (HCC): ICD-10-CM

## 2020-11-05 PROCEDURE — 3078F DIAST BP <80 MM HG: CPT | Performed by: INTERNAL MEDICINE

## 2020-11-05 PROCEDURE — 99214 OFFICE O/P EST MOD 30 MIN: CPT | Performed by: INTERNAL MEDICINE

## 2020-11-05 PROCEDURE — 3074F SYST BP LT 130 MM HG: CPT | Performed by: INTERNAL MEDICINE

## 2020-11-05 PROCEDURE — 3008F BODY MASS INDEX DOCD: CPT | Performed by: INTERNAL MEDICINE

## 2020-11-05 NOTE — ASSESSMENT & PLAN NOTE
Mild pulmonary hypertension, repeat echocardiogram prior to the next visit in about 6 months. Blood pressures well controlled.   Pulmonary function test suggestive of mild reactive airway disease but patient is asymptomatic and has not been started on any

## 2020-11-05 NOTE — ASSESSMENT & PLAN NOTE
Blood pressure 126/77, pulse 60, temperature 98.5 °F (36.9 °C), temperature source Tympanic, resp. rate 16, height 5' 7\" (1.702 m), weight 171 lb (77.6 kg), not currently breastfeeding. Blood pressures look well controlled.   Continue on losartan 50 mg

## 2020-11-05 NOTE — PROGRESS NOTES
HPI:    Patient ID: Karena Johnson is a 65year old female.     Written by Sandra Miller MD on 11/3/2020  9:00 AM  The total white cell count looks slightly low but the differential counts which indicate the consequent white cells all look normal. Negative. Negative for malaise/fatigue.           Immunization History  Administered            Date(s) Administered    FLU VAC High Dose 65 YRS & Older PRSV Free (15402)                          10/01/2016  10/01/2019  09/13/2020      FLUAD High Dose 70 y well-nourished. HENT:   Right Ear: External ear normal.   Left Ear: External ear normal.   Nose: Nose normal.   Mouth/Throat: Oropharynx is clear and moist. No oropharyngeal exudate. Eyes: Pupils are equal, round, and reactive to light.  Conjunctivae an stable. No renal dysfunction. Pulmonary HTN (HCC)     Mild pulmonary hypertension, repeat echocardiogram prior to the next visit in about 6 months. Blood pressures well controlled.   Pulmonary function test suggestive of mild reactive airway dise

## 2020-11-09 DIAGNOSIS — I10 ESSENTIAL HYPERTENSION: ICD-10-CM

## 2020-11-09 RX ORDER — LOSARTAN POTASSIUM 50 MG/1
50 TABLET ORAL DAILY
Qty: 90 TABLET | Refills: 1 | Status: SHIPPED | OUTPATIENT
Start: 2020-11-09 | End: 2021-02-02

## 2020-11-10 DIAGNOSIS — I10 ESSENTIAL HYPERTENSION: ICD-10-CM

## 2020-11-12 RX ORDER — HYDROCHLOROTHIAZIDE 12.5 MG/1
12.5 TABLET ORAL DAILY
Qty: 90 TABLET | Refills: 0 | Status: SHIPPED | OUTPATIENT
Start: 2020-11-12 | End: 2020-11-14

## 2020-11-14 RX ORDER — HYDROCHLOROTHIAZIDE 12.5 MG/1
12.5 TABLET ORAL DAILY
Qty: 90 TABLET | Refills: 1 | Status: SHIPPED | OUTPATIENT
Start: 2020-11-14 | End: 2021-07-02

## 2021-01-26 ENCOUNTER — TELEPHONE (OUTPATIENT)
Dept: INTERNAL MEDICINE CLINIC | Facility: CLINIC | Age: 71
End: 2021-01-26

## 2021-01-26 NOTE — TELEPHONE ENCOUNTER
Patient is 63yo and sees Dr. Simone Richardson  Patient is MyChart active  Interested in COVID-19 vaccine

## 2021-02-03 RX ORDER — LOSARTAN POTASSIUM 50 MG/1
50 TABLET ORAL DAILY
Qty: 90 TABLET | Refills: 1 | Status: SHIPPED | OUTPATIENT
Start: 2021-02-03 | End: 2021-06-29

## 2021-02-06 RX ORDER — LEVOCETIRIZINE DIHYDROCHLORIDE 5 MG/1
5 TABLET, FILM COATED ORAL EVERY EVENING
Qty: 90 TABLET | Refills: 1 | Status: SHIPPED | OUTPATIENT
Start: 2021-02-06 | End: 2021-07-28

## 2021-02-10 ENCOUNTER — TELEPHONE (OUTPATIENT)
Dept: INTERNAL MEDICINE CLINIC | Facility: CLINIC | Age: 71
End: 2021-02-10

## 2021-02-10 DIAGNOSIS — I10 ESSENTIAL HYPERTENSION: Primary | ICD-10-CM

## 2021-02-10 NOTE — TELEPHONE ENCOUNTER
Pt stated appt will be around 5/5/21, was advised to repeat EKG per OV notes, requesting Order  Pt also asking if Labs need to be repeated, last labs 10/31/20

## 2021-02-12 NOTE — TELEPHONE ENCOUNTER
Called and spoke to patient informing that Dr. Rossy Orozco had ordered the test requested. Patient verbalized understanding and will get them done closer to the date.

## 2021-03-09 DIAGNOSIS — Z23 NEED FOR VACCINATION: ICD-10-CM

## 2021-03-11 ENCOUNTER — TELEPHONE (OUTPATIENT)
Dept: CASE MANAGEMENT | Age: 71
End: 2021-03-11

## 2021-03-11 NOTE — TELEPHONE ENCOUNTER
Patient is eligible for a 2021 Medicare Annual Wellness visit. Left message to call back [phone].

## 2021-04-25 ENCOUNTER — EKG ENCOUNTER (OUTPATIENT)
Dept: LAB | Facility: HOSPITAL | Age: 71
End: 2021-04-25
Attending: INTERNAL MEDICINE
Payer: MEDICARE

## 2021-04-25 DIAGNOSIS — I10 ESSENTIAL HYPERTENSION: ICD-10-CM

## 2021-04-25 PROCEDURE — 93005 ELECTROCARDIOGRAM TRACING: CPT

## 2021-04-25 PROCEDURE — 93010 ELECTROCARDIOGRAM REPORT: CPT | Performed by: INTERNAL MEDICINE

## 2021-04-30 ENCOUNTER — TELEPHONE (OUTPATIENT)
Dept: INTERNAL MEDICINE CLINIC | Facility: CLINIC | Age: 71
End: 2021-04-30

## 2021-05-02 ENCOUNTER — LAB ENCOUNTER (OUTPATIENT)
Dept: LAB | Facility: HOSPITAL | Age: 71
End: 2021-05-02
Attending: INTERNAL MEDICINE
Payer: MEDICARE

## 2021-05-02 DIAGNOSIS — I10 ESSENTIAL HYPERTENSION: ICD-10-CM

## 2021-05-02 PROCEDURE — 80053 COMPREHEN METABOLIC PANEL: CPT

## 2021-05-02 PROCEDURE — 85025 COMPLETE CBC W/AUTO DIFF WBC: CPT

## 2021-05-02 PROCEDURE — 81003 URINALYSIS AUTO W/O SCOPE: CPT

## 2021-05-02 PROCEDURE — 84443 ASSAY THYROID STIM HORMONE: CPT

## 2021-05-02 PROCEDURE — 36415 COLL VENOUS BLD VENIPUNCTURE: CPT

## 2021-05-02 PROCEDURE — 80061 LIPID PANEL: CPT

## 2021-06-29 RX ORDER — LOSARTAN POTASSIUM 50 MG/1
50 TABLET ORAL DAILY
Qty: 90 TABLET | Refills: 1 | Status: SHIPPED | OUTPATIENT
Start: 2021-06-29

## 2021-07-02 ENCOUNTER — OFFICE VISIT (OUTPATIENT)
Dept: INTERNAL MEDICINE CLINIC | Facility: CLINIC | Age: 71
End: 2021-07-02
Payer: MEDICARE

## 2021-07-02 VITALS
DIASTOLIC BLOOD PRESSURE: 68 MMHG | RESPIRATION RATE: 16 BRPM | TEMPERATURE: 98 F | HEART RATE: 63 BPM | BODY MASS INDEX: 26.55 KG/M2 | SYSTOLIC BLOOD PRESSURE: 114 MMHG | WEIGHT: 169.13 LBS | HEIGHT: 67 IN

## 2021-07-02 DIAGNOSIS — I51.89 GRADE I DIASTOLIC DYSFUNCTION: ICD-10-CM

## 2021-07-02 DIAGNOSIS — I27.20 PULMONARY HTN (HCC): ICD-10-CM

## 2021-07-02 DIAGNOSIS — D22.9 ATYPICAL NEVI: ICD-10-CM

## 2021-07-02 DIAGNOSIS — Z00.00 ENCOUNTER FOR ANNUAL HEALTH EXAMINATION: ICD-10-CM

## 2021-07-02 DIAGNOSIS — Z85.3 HISTORY OF LEFT BREAST CANCER: ICD-10-CM

## 2021-07-02 DIAGNOSIS — D70.9 NEUTROPENIA, UNSPECIFIED TYPE (HCC): ICD-10-CM

## 2021-07-02 DIAGNOSIS — H25.13 AGE-RELATED NUCLEAR CATARACT OF BOTH EYES: ICD-10-CM

## 2021-07-02 DIAGNOSIS — I10 ESSENTIAL HYPERTENSION: ICD-10-CM

## 2021-07-02 DIAGNOSIS — J70.1 RADIATION FIBROSIS OF LUNG (HCC): ICD-10-CM

## 2021-07-02 DIAGNOSIS — Z12.11 COLON CANCER SCREENING: ICD-10-CM

## 2021-07-02 DIAGNOSIS — Z00.00 MEDICARE ANNUAL WELLNESS VISIT, SUBSEQUENT: Primary | ICD-10-CM

## 2021-07-02 DIAGNOSIS — R92.2 DENSE BREAST TISSUE ON MAMMOGRAM: ICD-10-CM

## 2021-07-02 DIAGNOSIS — H40.003 GLAUCOMA SUSPECT OF BOTH EYES: ICD-10-CM

## 2021-07-02 PROBLEM — M47.816 FACET ARTHRITIS, DEGENERATIVE, LUMBAR SPINE: Status: RESOLVED | Noted: 2020-06-16 | Resolved: 2021-07-02

## 2021-07-02 PROCEDURE — 3078F DIAST BP <80 MM HG: CPT | Performed by: INTERNAL MEDICINE

## 2021-07-02 PROCEDURE — 96160 PT-FOCUSED HLTH RISK ASSMT: CPT | Performed by: INTERNAL MEDICINE

## 2021-07-02 PROCEDURE — G0439 PPPS, SUBSEQ VISIT: HCPCS | Performed by: INTERNAL MEDICINE

## 2021-07-02 PROCEDURE — 3074F SYST BP LT 130 MM HG: CPT | Performed by: INTERNAL MEDICINE

## 2021-07-02 PROCEDURE — 3008F BODY MASS INDEX DOCD: CPT | Performed by: INTERNAL MEDICINE

## 2021-07-02 PROCEDURE — 99397 PER PM REEVAL EST PAT 65+ YR: CPT | Performed by: INTERNAL MEDICINE

## 2021-07-02 RX ORDER — HYDROCHLOROTHIAZIDE 12.5 MG/1
12.5 TABLET ORAL DAILY
Qty: 90 TABLET | Refills: 1 | Status: SHIPPED | OUTPATIENT
Start: 2021-07-02 | End: 2022-01-19

## 2021-07-02 NOTE — ASSESSMENT & PLAN NOTE
Mild asymptomatic intermittent neutropenia. This seems to be resolved at this time. Continue to monitor. She does not have any history of fevers, chills, cough, urinary infection or diarrhea.

## 2021-07-02 NOTE — PROGRESS NOTES
HPI:   Alicia Givens is a 66year old female who presents for a Medicare Subsequent Annual Wellness visit (Pt already had Initial Annual Wellness).          Fall/Risk Assessment   She has been screened for Falls and is low risk: Fall/Risk Scoring: of both eyes     Encounter for routine gynecological examination with Papanicolaou smear of cervix     Vitreous floaters of both eyes     Radiation fibrosis of lung ([address]     Pulmonary HTN ([address]     Grade I diastolic dysfunction     Osteopenia of femoral n SOCIAL HISTORY:   She  reports that she has never smoked. She has never used smokeless tobacco. She reports that she does not drink alcohol and does not use drugs.      REVIEW OF SYSTEMS:   Review of Systems   GENERAL: feels well otherwise  SKIN: denies a have trouble understanding the speech of women and children: No I have trouble understanding the speaker in a large room such as at a meeting or place of [employer]: No   Many people I talk to seem to mumble (or don't speak clearly): No People get annoyed bec symmetrical.         Right: No inverted nipple, mass, nipple discharge, skin change or tenderness. Left: No inverted nipple, mass, nipple discharge, skin change or tenderness.    Abdominal:      General: Bowel sounds are normal. There is no distensi Coordination normal.      Gait: Gait normal.      Deep Tendon Reflexes: Reflexes normal.   Psychiatric:         Mood and Affect: Mood and affect normal.         Behavior: Behavior normal.         Thought Content:  Thought content normal.         Vaccination mammogram has been provided. Relevant Orders    [facility] OWEN 2D+3D DIAGNOSTIC [facility]  BILAT (GDC=[id number])    Hypertension     Blood pressure 114/68, pulse 63, temperature 97.5 °F (36.4 °C), temperature source Oral, resp.  rate 16, height 5' 7\" (1.70 abnormalities. No cystocele, rectocele or uterine descent.   Pap completed in 2018 was normal.  Immunizations-    Immunization History   Administered Date(s) Administered   • Covid-19 Vaccine Pfizer 30 mcg/0.3 ml 02/06/2021, 03/05/2021   • FLU VAC High Dos good     PLAN:  The patient indicates understanding of these issues and agrees to the plan. Consult ordered. Continue with current treatment plan. Further testing ordered. Imaging studies ordered. Lab work ordered. Patient reassured.   Reinforced heal Aortic Aneurysm (AAA) Covered once in a lifetime for one of the following risk factors   • Men who are 73-68 years old and have ever smoked   • Anyone with a family history -     Colorectal Cancer Screening  Covered for ages 52-80; only need ONE of the fol by Medicare Part B; may be covered with your pharmacy  prescription benefits -  Zoster Vaccines(1 of 2) Never done        Annual Monitoring of Persistent Medications (ACE/ARB, digoxin diuretics, anticonvulsants)    Potassium Annually Lab Results   Componen

## 2021-07-02 NOTE — ASSESSMENT & PLAN NOTE
History of early cataracts without visual compromise. Last evaluated in July 2020. Recommend a follow-up evaluation at this time.

## 2021-07-02 NOTE — ASSESSMENT & PLAN NOTE
Patient was noted to have increased cupping of the optic nerves bilaterally. IOP normal at her last evaluation and optic nerves were normal.  She is advised to follow-up in 1 year, due at this time. Referral provided.

## 2021-07-02 NOTE — ASSESSMENT & PLAN NOTE
Echocardiogram completed last year showed grade 1 diastolic dysfunction with pulmonary hypertension. Follow-up echocardiogram ordered. Asymptomatic otherwise.

## 2021-07-02 NOTE — ASSESSMENT & PLAN NOTE
History of left breast cancer, status post lumpectomy, axillary dissection about 10 years back followed by radiation treatment. She has been followed up her mammograms. Diagnostic tomosynthesis mammogram has been provided.

## 2021-07-02 NOTE — ASSESSMENT & PLAN NOTE
Blood pressure 114/68, pulse 63, temperature 97.5 °F (36.4 °C), temperature source Oral, resp. rate 16, height 5' 7\" (1.702 m), weight 169 lb 1.6 oz (76.7 kg), not currently breastfeeding.   Stable blood pressure, well controlled on losartan 50 mg daily an

## 2021-07-02 NOTE — PATIENT INSTRUCTIONS
Problem List Items Addressed This Visit        Unprioritized    Age-related nuclear cataract of both eyes     History of early cataracts without visual compromise. Last evaluated in July 2020. Recommend a follow-up evaluation at this time.          Fernanda Ortiz hydrochlorothiazide 12.5 MG Oral Tab    Other Relevant Orders    CBC WITH DIFFERENTIAL WITH PLATELET    COMP METABOLIC PANEL (14)    LIPID PANEL    URINALYSIS, ROUTINE    TSH W REFLEX TO FREE T4    Medicare annual wellness visit, subsequent - Primary     N Repeat echocardiogram has been requested. Pulmonary function test suggestive of mild reactive airway disease, patient has been asymptomatic. Monitor at this time.          Relevant Orders    CARD ECHO 2D DOPPLER (CPT=93306)    Radiation fibrosis of lung ( Screening for Abdominal Aortic Aneurysm (AAA) Covered once in a lifetime for one of the following risk factors   • Men who are 73-68 years old and have ever smoked   • Anyone with a family history -     Colorectal Cancer Screening  Covered for ages 52-80; Zoster Not covered by Medicare Part B; may be covered with your pharmacy  prescription benefits -  Zoster Vaccines(1 of 2) Never done        Annual Monitoring of Persistent Medications (ACE/ARB, digoxin diuretics, anticonvulsants)    Potassium Annually

## 2021-07-02 NOTE — ASSESSMENT & PLAN NOTE
Incidentally noted post radiation related fibrosis in the anterior left upper lobe as well as scattered pleuroparenchymal scarring. She does not have any exertional dyspnea or shortness of breath. Pulmonary function tests discussed.   Currently does not n

## 2021-07-02 NOTE — ASSESSMENT & PLAN NOTE
Mild pulmonary hypertension. Repeat echocardiogram has been requested. Pulmonary function test suggestive of mild reactive airway disease, patient has been asymptomatic. Monitor at this time.

## 2021-07-02 NOTE — ASSESSMENT & PLAN NOTE
Normal exam.  Labs as ordered. Skin check normal.  Multiple scattered nevi present. Advised to follow-up with dermatology for an evaluation. Breast exam completed–no palpable abnormalities, discharge from the nipples or axillary adenopathy.   History of

## 2021-07-06 ENCOUNTER — OFFICE VISIT (OUTPATIENT)
Dept: OPHTHALMOLOGY | Facility: CLINIC | Age: 71
End: 2021-07-06
Payer: MEDICARE

## 2021-07-06 DIAGNOSIS — H40.003 GLAUCOMA SUSPECT OF BOTH EYES: Primary | ICD-10-CM

## 2021-07-06 DIAGNOSIS — H25.13 AGE-RELATED NUCLEAR CATARACT OF BOTH EYES: ICD-10-CM

## 2021-07-06 DIAGNOSIS — H02.883 MEIBOMIAN GLAND DYSFUNCTION (MGD) OF BOTH EYES: ICD-10-CM

## 2021-07-06 DIAGNOSIS — H43.393 VITREOUS FLOATERS OF BOTH EYES: ICD-10-CM

## 2021-07-06 DIAGNOSIS — H02.886 MEIBOMIAN GLAND DYSFUNCTION (MGD) OF BOTH EYES: ICD-10-CM

## 2021-07-06 PROCEDURE — 92014 COMPRE OPH EXAM EST PT 1/>: CPT | Performed by: OPHTHALMOLOGY

## 2021-07-06 PROCEDURE — 92250 FUNDUS PHOTOGRAPHY W/I&R: CPT | Performed by: OPHTHALMOLOGY

## 2021-07-06 PROCEDURE — 92015 DETERMINE REFRACTIVE STATE: CPT | Performed by: OPHTHALMOLOGY

## 2021-07-06 NOTE — PROGRESS NOTES
Estela Castle is a 66year old female. HPI:     HPI     Pt in today for a complete eye exam and photos. Pt complains of blurry vision in the left eye at distance and states she has to constantly blink to \"clear up\" her vision.  Pt has been using ph cc  NI    Near cc 20/25- 20/25    Correction: Glasses          Tonometry (Applanation, 8:30 AM)       Right Left    Pressure 16 16          Pachymetry (4/29/2017)       Right Left    Thickness 556/-1 553/-1          Pupils       Pupils    Right PERRL warm compress use:   Patient should place wash compresses on both eyelids for 5 minutes every morning and every night.   After 5 minutes of holding the warm compresses on the eyelids, patient should gently rub the eyelashes and then rinse thoroughly with wa

## 2021-07-12 ENCOUNTER — HOSPITAL ENCOUNTER (OUTPATIENT)
Dept: CV DIAGNOSTICS | Facility: HOSPITAL | Age: 71
Discharge: HOME | End: 2021-07-12
Attending: INTERNAL MEDICINE
Payer: MEDICARE

## 2021-07-12 DIAGNOSIS — I27.20 PULMONARY HTN (HCC): ICD-10-CM

## 2021-07-12 PROCEDURE — 93306 TTE W/DOPPLER COMPLETE: CPT | Performed by: INTERNAL MEDICINE

## 2021-07-13 ENCOUNTER — TELEPHONE (OUTPATIENT)
Dept: OPHTHALMOLOGY | Facility: CLINIC | Age: 71
End: 2021-07-13

## 2021-07-21 ENCOUNTER — NURSE ONLY (OUTPATIENT)
Dept: OPHTHALMOLOGY | Facility: CLINIC | Age: 71
End: 2021-07-21
Payer: MEDICARE

## 2021-07-21 ENCOUNTER — TELEPHONE (OUTPATIENT)
Dept: OPHTHALMOLOGY | Facility: CLINIC | Age: 71
End: 2021-07-21

## 2021-07-21 DIAGNOSIS — H40.003 GLAUCOMA SUSPECT OF BOTH EYES: ICD-10-CM

## 2021-07-21 PROCEDURE — 92083 EXTENDED VISUAL FIELD XM: CPT | Performed by: OPHTHALMOLOGY

## 2021-07-21 PROCEDURE — 92133 CPTRZD OPH DX IMG PST SGM ON: CPT | Performed by: OPHTHALMOLOGY

## 2021-07-21 NOTE — PROGRESS NOTES
Debbie Rodriguez is a 66year old female.     HPI:     HPI     Pt is here for a VF and OCT with no MD.     Last edited by Quan Vela OT on 7/21/2021  7:57 AM. (History)        Patient History:  Past Medical History:   Diagnosis Date   • Breast cancer requested or ordered in this encounter        Follow up instructions:  Return in about 1 year (around 7/21/2022) for Dilated exam.    7/21/2021  Scribed by: Yogesh Valero MD

## 2021-07-28 RX ORDER — LEVOCETIRIZINE DIHYDROCHLORIDE 5 MG/1
5 TABLET, FILM COATED ORAL EVERY EVENING
Qty: 90 TABLET | Refills: 1 | Status: SHIPPED | OUTPATIENT
Start: 2021-07-28

## 2021-07-28 NOTE — TELEPHONE ENCOUNTER
Refill passed per [facility] protocol.   Requested Prescriptions   Pending Prescriptions Disp Refills    LEVOCETIRIZINE DIHYDROCHLORIDE 5 MG Oral Tab [Pharmacy Med Name: Levocetirizine Dihydrochloride 5 Mg Tab [facility]] 90 tablet 0     Sig: Take 1 tablet b

## 2021-09-10 ENCOUNTER — HOSPITAL ENCOUNTER (OUTPATIENT)
Age: 71
Discharge: HOME | End: 2021-09-10
Attending: PHYSICIAN ASSISTANT
Payer: MEDICARE

## 2021-09-10 VITALS
RESPIRATION RATE: 16 BRPM | HEIGHT: 68 IN | SYSTOLIC BLOOD PRESSURE: 155 MMHG | TEMPERATURE: 98 F | OXYGEN SATURATION: 96 % | DIASTOLIC BLOOD PRESSURE: 90 MMHG | HEART RATE: 61 BPM | BODY MASS INDEX: 24.86 KG/M2 | WEIGHT: 164 LBS

## 2021-09-10 DIAGNOSIS — R03.0 ELEVATED BLOOD PRESSURE READING: ICD-10-CM

## 2021-09-10 DIAGNOSIS — J02.0 ACUTE STREPTOCOCCAL PHARYNGITIS: Primary | ICD-10-CM

## 2021-09-10 LAB — S PYO AG THROAT QL: POSITIVE

## 2021-09-10 PROCEDURE — 87880 STREP A ASSAY W/OPTIC: CPT | Performed by: PHYSICIAN ASSISTANT

## 2021-09-10 PROCEDURE — 99213 OFFICE O/P EST LOW 20 MIN: CPT | Performed by: PHYSICIAN ASSISTANT

## 2021-09-10 RX ORDER — PENICILLIN V POTASSIUM 500 MG/1
500 TABLET ORAL 2 TIMES DAILY
Qty: 20 TABLET | Refills: 0 | Status: SHIPPED | OUTPATIENT
Start: 2021-09-10 | End: 2021-09-20

## 2021-09-11 NOTE — ED PROVIDER NOTES
Patient Seen in: [facility]    History   Patient presents with:  Sore Throat    Stated Complaint: Sore Throat-granddaughter has strep    HPI    47-year-old female presents with chief complaint of sore throat. Onset 3 days ago.   Patient state Tobacco Use      Smoking status: Never Smoker      Smokeless tobacco: Never Used    Vaping Use      Vaping Use: Never used    Alcohol use: No      Alcohol/week: 0.0 standard drinks    Drug use: No      Review of Systems    Positive for stated complaint:  So organomegaly is noted. No peritoneal signs. Genitourinary: Not examined. Lymphatic: No gross lymphadenopathy noted. Musculoskeletal: Musculoskeletal system is grossly intact. There is no obvious deformity.   Neurological: Gross motor movement is intact medications    penicillin v potassium 500 MG Oral Tab  Take 1 tablet (500 mg total) by mouth 2 (two) times daily for 10 days.   Qty: 20 tablet Refills: 0

## 2021-09-16 ENCOUNTER — TELEMEDICINE (OUTPATIENT)
Dept: INTERNAL MEDICINE CLINIC | Facility: CLINIC | Age: 71
End: 2021-09-16

## 2021-09-16 ENCOUNTER — LAB ENCOUNTER (OUTPATIENT)
Dept: LAB | Facility: HOSPITAL | Age: 71
End: 2021-09-16
Attending: INTERNAL MEDICINE
Payer: MEDICARE

## 2021-09-16 ENCOUNTER — TELEPHONE (OUTPATIENT)
Dept: INTERNAL MEDICINE CLINIC | Facility: CLINIC | Age: 71
End: 2021-09-16

## 2021-09-16 DIAGNOSIS — Z20.822 ENCOUNTER FOR SCREENING LABORATORY TESTING FOR COVID-19 VIRUS: ICD-10-CM

## 2021-09-16 DIAGNOSIS — R53.83 OTHER FATIGUE: ICD-10-CM

## 2021-09-16 DIAGNOSIS — J02.9 SORE THROAT: ICD-10-CM

## 2021-09-16 DIAGNOSIS — J02.9 SORE THROAT: Primary | ICD-10-CM

## 2021-09-16 DIAGNOSIS — J02.0 STREP THROAT: Primary | ICD-10-CM

## 2021-09-16 PROCEDURE — 99213 OFFICE O/P EST LOW 20 MIN: CPT | Performed by: INTERNAL MEDICINE

## 2021-09-16 RX ORDER — AMOXICILLIN AND CLAVULANATE POTASSIUM 875; 125 MG/1; MG/1
1 TABLET, FILM COATED ORAL 2 TIMES DAILY
Qty: 20 TABLET | Refills: 0 | Status: SHIPPED | OUTPATIENT
Start: 2021-09-16 | End: 2021-09-26

## 2021-09-16 NOTE — TELEPHONE ENCOUNTER
Spoke with pt,  verified, pt stated she was seen at [facility] on 9-10 for positive strep. Pt stated was rx'd PCN, on rx  since Friday 9/10.    Pt stated Rx is not helping at all, she feel she is getting worse, still has sore throat, fatigued/ tired, congestio

## 2021-09-16 NOTE — PATIENT INSTRUCTIONS
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   Granddaughter had tested positive for stre

## 2021-09-16 NOTE — ASSESSMENT & PLAN NOTE
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 Granddaughter had tested positive for strep and negative for Covid.   She went to the immediate care and she tested positive for strep

## 2021-09-16 NOTE — PROGRESS NOTES
HPI:    Patient ID: Grace Mar is a 66year old female.   Telehealth outside of [facility] [address] Verbal Consent   I conducted a telehealth visit with Grace Mar today, 09/16/21, which was completed using two-way, real-time interactive fever. The fever has been present for 5 days or more. The pain is at a severity of 5/10. The pain is moderate. Associated symptoms include congestion, headaches, a plugged ear sensation, swollen glands and trouble swallowing.  Pertinent negatives include no No respiratory distress. Musculoskeletal:      Cervical back: No rigidity. Skin:     Findings: No rash. Neurological:      Mental Status: She is alert.    Psychiatric:         Mood and Affect: Mood and affect normal.         Behavior: Behavior normal.

## 2021-09-17 LAB — SARS-COV-2 RNA RESP QL NAA+PROBE: NOT DETECTED

## 2021-10-03 ENCOUNTER — IMMUNIZATION (OUTPATIENT)
Dept: LAB | Facility: HOSPITAL | Age: 71
End: 2021-10-03
Attending: EMERGENCY MEDICINE
Payer: MEDICARE

## 2021-10-03 DIAGNOSIS — Z23 NEED FOR VACCINATION: Primary | ICD-10-CM

## 2021-10-03 PROCEDURE — 0003A SARSCOV2 VAC 30MCG/0.3ML IM: CPT

## 2021-10-12 ENCOUNTER — HOSPITAL ENCOUNTER (OUTPATIENT)
Dept: MAMMOGRAPHY | Facility: HOSPITAL | Age: 71
Discharge: HOME | End: 2021-10-12
Attending: INTERNAL MEDICINE
Payer: MEDICARE

## 2021-10-12 DIAGNOSIS — R92.2 DENSE BREAST TISSUE ON MAMMOGRAM: ICD-10-CM

## 2021-10-12 DIAGNOSIS — Z85.3 HISTORY OF LEFT BREAST CANCER: ICD-10-CM

## 2021-10-12 PROCEDURE — 77062 BREAST TOMOSYNTHESIS BI: CPT | Performed by: INTERNAL MEDICINE

## 2021-10-12 PROCEDURE — 77066 DX MAMMO INCL CAD BI: CPT | Performed by: INTERNAL MEDICINE

## 2021-11-13 ENCOUNTER — NURSE TRIAGE (OUTPATIENT)
Dept: INTERNAL MEDICINE CLINIC | Facility: CLINIC | Age: 71
End: 2021-11-13

## 2021-11-13 DIAGNOSIS — Z20.822 EXPOSURE TO COVID-19 VIRUS: Primary | ICD-10-CM

## 2021-11-13 NOTE — TELEPHONE ENCOUNTER
Action Requested: Summary for Provider     []  Critical Lab, Recommendations Needed  [] Need Additional Advice  [x]   FYI    []   Need Orders  [] Need Medications Sent to Pharmacy  []  Other     SUMMARY: Patient calling with complaint of COVID-19 exposure.

## 2021-11-13 NOTE — TELEPHONE ENCOUNTER
Advised patient of Dr. Palmira Simms note and already had number to scheduling. Patient verbalized understanding.

## 2021-11-16 ENCOUNTER — NURSE ONLY (OUTPATIENT)
Dept: LAB | Facility: HOSPITAL | Age: 71
End: 2021-11-16
Attending: INTERNAL MEDICINE
Payer: MEDICARE

## 2021-11-16 DIAGNOSIS — Z20.822 EXPOSURE TO COVID-19 VIRUS: ICD-10-CM

## 2022-01-18 DIAGNOSIS — I10 ESSENTIAL HYPERTENSION: ICD-10-CM

## 2022-01-19 RX ORDER — HYDROCHLOROTHIAZIDE 12.5 MG/1
12.5 TABLET ORAL DAILY
Qty: 90 TABLET | Refills: 1 | Status: SHIPPED | OUTPATIENT
Start: 2022-01-19

## 2022-02-23 RX ORDER — LOSARTAN POTASSIUM 50 MG/1
50 TABLET ORAL DAILY
Qty: 90 TABLET | Refills: 1 | Status: SHIPPED | OUTPATIENT
Start: 2022-02-23

## 2022-03-02 ENCOUNTER — LAB ENCOUNTER (OUTPATIENT)
Dept: LAB | Facility: HOSPITAL | Age: 72
End: 2022-03-02
Attending: INTERNAL MEDICINE
Payer: MEDICARE

## 2022-03-02 DIAGNOSIS — I10 ESSENTIAL HYPERTENSION: ICD-10-CM

## 2022-03-02 LAB
ALBUMIN SERPL-MCNC: 3.4 G/DL (ref 3.4–5)
ALBUMIN/GLOB SERPL: 1.2 {RATIO} (ref 1–2)
ALP LIVER SERPL-CCNC: 94 U/L
ALT SERPL-CCNC: 28 U/L
ANION GAP SERPL CALC-SCNC: 3 MMOL/L (ref 0–18)
AST SERPL-CCNC: 24 U/L (ref 15–37)
BASOPHILS # BLD AUTO: 0.02 X10(3) UL (ref 0–0.2)
BASOPHILS NFR BLD AUTO: 0.6 %
BILIRUB SERPL-MCNC: 0.6 MG/DL (ref 0.1–2)
BILIRUB UR QL: NEGATIVE
BUN BLD-MCNC: 18 MG/DL (ref 7–18)
BUN/CREAT SERPL: 26.9 (ref 10–20)
CALCIUM BLD-MCNC: 9 MG/DL (ref 8.5–10.1)
CHLORIDE SERPL-SCNC: 108 MMOL/L (ref 98–112)
CHOLEST SERPL-MCNC: 186 MG/DL (ref ?–200)
CO2 SERPL-SCNC: 33 MMOL/L (ref 21–32)
COLOR UR: YELLOW
CREAT BLD-MCNC: 0.67 MG/DL
DEPRECATED RDW RBC AUTO: 44.1 FL (ref 35.1–46.3)
EOSINOPHIL # BLD AUTO: 0.16 X10(3) UL (ref 0–0.7)
EOSINOPHIL NFR BLD AUTO: 5 %
ERYTHROCYTE [DISTWIDTH] IN BLOOD BY AUTOMATED COUNT: 12.7 % (ref 11–15)
FASTING PATIENT LIPID ANSWER: YES
FASTING STATUS PATIENT QL REPORTED: YES
GLOBULIN PLAS-MCNC: 2.8 G/DL (ref 2.8–4.4)
GLUCOSE BLD-MCNC: 77 MG/DL (ref 70–99)
GLUCOSE UR-MCNC: NEGATIVE MG/DL
HCT VFR BLD AUTO: 42.4 %
HDLC SERPL-MCNC: 76 MG/DL (ref 40–59)
HGB BLD-MCNC: 13.6 G/DL
HGB UR QL STRIP.AUTO: NEGATIVE
IMM GRANULOCYTES # BLD AUTO: 0.01 X10(3) UL (ref 0–1)
IMM GRANULOCYTES NFR BLD: 0.3 %
KETONES UR-MCNC: NEGATIVE MG/DL
LDLC SERPL CALC-MCNC: 102 MG/DL (ref ?–100)
LEUKOCYTE ESTERASE UR QL STRIP.AUTO: NEGATIVE
LYMPHOCYTES # BLD AUTO: 0.98 X10(3) UL (ref 1–4)
LYMPHOCYTES NFR BLD AUTO: 30.8 %
MCH RBC QN AUTO: 30.1 PG (ref 26–34)
MCHC RBC AUTO-ENTMCNC: 32.1 G/DL (ref 31–37)
MCV RBC AUTO: 93.8 FL
MONOCYTES # BLD AUTO: 0.33 X10(3) UL (ref 0.1–1)
MONOCYTES NFR BLD AUTO: 10.4 %
NEUTROPHILS # BLD AUTO: 1.68 X10 (3) UL (ref 1.5–7.7)
NEUTROPHILS # BLD AUTO: 1.68 X10(3) UL (ref 1.5–7.7)
NEUTROPHILS NFR BLD AUTO: 52.9 %
NITRITE UR QL STRIP.AUTO: NEGATIVE
NONHDLC SERPL-MCNC: 110 MG/DL (ref ?–130)
PH UR: 7 [PH] (ref 5–8)
PLATELET # BLD AUTO: 238 10(3)UL (ref 150–450)
POTASSIUM SERPL-SCNC: 4.2 MMOL/L (ref 3.5–5.1)
PROT SERPL-MCNC: 6.2 G/DL (ref 6.4–8.2)
PROT UR-MCNC: 30 MG/DL
RBC # BLD AUTO: 4.52 X10(6)UL
SODIUM SERPL-SCNC: 144 MMOL/L (ref 136–145)
SP GR UR STRIP: 1.02 (ref 1–1.03)
TRIGL SERPL-MCNC: 37 MG/DL (ref 30–149)
TSI SER-ACNC: 1.89 MIU/ML (ref 0.36–3.74)
UROBILINOGEN UR STRIP-ACNC: <2
VLDLC SERPL CALC-MCNC: 6 MG/DL (ref 0–30)
WBC # BLD AUTO: 3.2 X10(3) UL (ref 4–11)

## 2022-03-02 PROCEDURE — 84443 ASSAY THYROID STIM HORMONE: CPT

## 2022-03-02 PROCEDURE — 80061 LIPID PANEL: CPT

## 2022-03-02 PROCEDURE — 81001 URINALYSIS AUTO W/SCOPE: CPT

## 2022-03-02 PROCEDURE — 80053 COMPREHEN METABOLIC PANEL: CPT

## 2022-03-02 PROCEDURE — 85025 COMPLETE CBC W/AUTO DIFF WBC: CPT

## 2022-03-02 PROCEDURE — 36415 COLL VENOUS BLD VENIPUNCTURE: CPT

## 2022-03-04 ENCOUNTER — LAB ENCOUNTER (OUTPATIENT)
Dept: LAB | Facility: HOSPITAL | Age: 72
End: 2022-03-04
Attending: INTERNAL MEDICINE
Payer: MEDICARE

## 2022-03-04 DIAGNOSIS — Z12.11 COLON CANCER SCREENING: ICD-10-CM

## 2022-03-04 LAB — HEMOCCULT STL QL: NEGATIVE

## 2022-03-04 PROCEDURE — 82274 ASSAY TEST FOR BLOOD FECAL: CPT

## 2022-03-05 ENCOUNTER — OFFICE VISIT (OUTPATIENT)
Dept: INTERNAL MEDICINE CLINIC | Facility: CLINIC | Age: 72
End: 2022-03-05
Payer: MEDICARE

## 2022-03-05 VITALS
SYSTOLIC BLOOD PRESSURE: 133 MMHG | TEMPERATURE: 98 F | WEIGHT: 175 LBS | DIASTOLIC BLOOD PRESSURE: 81 MMHG | BODY MASS INDEX: 26.52 KG/M2 | HEART RATE: 70 BPM | HEIGHT: 68 IN | RESPIRATION RATE: 16 BRPM

## 2022-03-05 DIAGNOSIS — I10 PRIMARY HYPERTENSION: Primary | ICD-10-CM

## 2022-03-05 DIAGNOSIS — D70.9 NEUTROPENIA, UNSPECIFIED TYPE (HCC): ICD-10-CM

## 2022-03-05 DIAGNOSIS — I27.20 PULMONARY HTN (HCC): ICD-10-CM

## 2022-03-05 DIAGNOSIS — J70.1 RADIATION FIBROSIS OF LUNG (HCC): ICD-10-CM

## 2022-03-05 DIAGNOSIS — R31.21 ASYMPTOMATIC MICROSCOPIC HEMATURIA: ICD-10-CM

## 2022-03-05 DIAGNOSIS — Z12.31 VISIT FOR SCREENING MAMMOGRAM: ICD-10-CM

## 2022-03-05 DIAGNOSIS — Z78.0 MENOPAUSE: ICD-10-CM

## 2022-03-05 PROBLEM — J02.0 STREP THROAT: Status: RESOLVED | Noted: 2021-09-16 | Resolved: 2022-03-05

## 2022-03-05 PROCEDURE — 3008F BODY MASS INDEX DOCD: CPT | Performed by: INTERNAL MEDICINE

## 2022-03-05 PROCEDURE — 3075F SYST BP GE 130 - 139MM HG: CPT | Performed by: INTERNAL MEDICINE

## 2022-03-05 PROCEDURE — 99214 OFFICE O/P EST MOD 30 MIN: CPT | Performed by: INTERNAL MEDICINE

## 2022-03-05 PROCEDURE — 3079F DIAST BP 80-89 MM HG: CPT | Performed by: INTERNAL MEDICINE

## 2022-03-05 NOTE — ASSESSMENT & PLAN NOTE
Incidentally noted asymptomatic postradiation fibrosis in the left upper lobe as well as scattered pleuroparenchymal scarring associated with mild reactive airway disease on the pulmonary function test.  Discussed repetition of the CT calcium scoring heart scan as last done in 2018.

## 2022-03-05 NOTE — ASSESSMENT & PLAN NOTE
Mild asymptomatic intermittent neutropenia/lymphocytopenia. Patient without any history of fevers, chills, cough, UTI or diarrhea. Continue to monitor.

## 2022-04-04 ENCOUNTER — TELEPHONE (OUTPATIENT)
Dept: OPHTHALMOLOGY | Facility: CLINIC | Age: 72
End: 2022-04-04

## 2022-04-04 NOTE — TELEPHONE ENCOUNTER
Pt complains of blurred vision with her glasses and would like to come in sooner for her dilated EE. Apt booked at 1:00 for a dilated EE next Wednesday with ARVIND.

## 2022-04-13 ENCOUNTER — OFFICE VISIT (OUTPATIENT)
Dept: OPHTHALMOLOGY | Facility: CLINIC | Age: 72
End: 2022-04-13
Payer: MEDICARE

## 2022-04-13 DIAGNOSIS — H43.393 VITREOUS FLOATERS OF BOTH EYES: ICD-10-CM

## 2022-04-13 DIAGNOSIS — H02.883 MEIBOMIAN GLAND DYSFUNCTION (MGD) OF BOTH EYES: ICD-10-CM

## 2022-04-13 DIAGNOSIS — H25.13 AGE-RELATED NUCLEAR CATARACT OF BOTH EYES: Primary | ICD-10-CM

## 2022-04-13 DIAGNOSIS — H02.886 MEIBOMIAN GLAND DYSFUNCTION (MGD) OF BOTH EYES: ICD-10-CM

## 2022-04-13 DIAGNOSIS — H40.003 GLAUCOMA SUSPECT OF BOTH EYES: ICD-10-CM

## 2022-04-13 PROCEDURE — 92015 DETERMINE REFRACTIVE STATE: CPT | Performed by: OPHTHALMOLOGY

## 2022-04-13 PROCEDURE — 92014 COMPRE OPH EXAM EST PT 1/>: CPT | Performed by: OPHTHALMOLOGY

## 2022-04-13 NOTE — ASSESSMENT & PLAN NOTE
Patient is a glaucoma suspect due to increased cupping of the optic nerves in both eyes. Patient had normal glaucoma diagnostics last year. IOP is normal today. Optic nerves are stable. There is no diagnosis of glaucoma at this time, but will follow in 1 year for dilated eye exam and photos.

## 2022-04-13 NOTE — ASSESSMENT & PLAN NOTE
Patient was instructed to use warm compresses to the eyelids twice a day everyday. Instructions for warm compress use:   Patient should place wash compresses on both eyelids for 5 minutes every morning and every night. After 5 minutes of holding the warm compresses on the eyelids, patient should gently rub the eyelashes and then rinse thoroughly with warm water. Continue artificial tears 4 times a day in both eyes.

## 2022-04-13 NOTE — PATIENT INSTRUCTIONS
Glaucoma suspect of both eyes  Patient is a glaucoma suspect due to increased cupping of the optic nerves in both eyes. Patient had normal glaucoma diagnostics last year. IOP is normal today. Optic nerves are stable. There is no diagnosis of glaucoma at this time, but will follow in 1 year for dilated eye exam and photos. Vitreous floaters of both eyes  No treatment. Meibomian gland dysfunction (MGD) of both eyes  Patient was instructed to use warm compresses to the eyelids twice a day everyday. Instructions for warm compress use:   Patient should place wash compresses on both eyelids for 5 minutes every morning and every night. After 5 minutes of holding the warm compresses on the eyelids, patient should gently rub the eyelashes and then rinse thoroughly with warm water. Continue artificial tears 4 times a day in both eyes. Age-related nuclear cataract of both eyes  Discussed with patient that decrease in vision in the left eye is caused by slightly worsened cataract in the left eye. Suggest update glasses first and if vision is not good enough with new glasses, to consider left cataract surgery.

## 2022-04-13 NOTE — ASSESSMENT & PLAN NOTE
Discussed with patient that decrease in vision in the left eye is caused by slightly worsened cataract in the left eye. Suggest update glasses first and if vision is not good enough with new glasses, to consider left cataract surgery.

## 2022-05-11 ENCOUNTER — TELEPHONE (OUTPATIENT)
Dept: OTHER | Age: 72
End: 2022-05-11

## 2022-05-11 NOTE — TELEPHONE ENCOUNTER
[employer] Scheduling called to verify questions about patient's 2D echo. Relayed that Dr. Rafa Galindo does not work here anymore and went to [facility] in March. She will try to contact Dr. Lui Smiley new practice.

## 2022-05-16 ENCOUNTER — TELEPHONE (OUTPATIENT)
Dept: CASE MANAGEMENT | Age: 72
End: 2022-05-16

## 2022-05-16 NOTE — TELEPHONE ENCOUNTER
Patient needs to contact insurance company to have PCP updated. Insurance has patient assigned to Dr Jian Spann. Discussed in detail w/patient. Patient verbalized understanding.

## 2022-05-25 ENCOUNTER — LAB ENCOUNTER (OUTPATIENT)
Dept: LAB | Facility: HOSPITAL | Age: 72
End: 2022-05-25
Attending: INTERNAL MEDICINE
Payer: MEDICARE

## 2022-05-25 DIAGNOSIS — R31.21 ASYMPTOMATIC MICROSCOPIC HEMATURIA: ICD-10-CM

## 2022-05-25 LAB
BILIRUB UR QL: NEGATIVE
CLARITY UR: CLEAR
COLOR UR: YELLOW
GLUCOSE UR-MCNC: NEGATIVE MG/DL
HGB UR QL STRIP.AUTO: NEGATIVE
KETONES UR-MCNC: NEGATIVE MG/DL
LEUKOCYTE ESTERASE UR QL STRIP.AUTO: NEGATIVE
NITRITE UR QL STRIP.AUTO: NEGATIVE
PH UR: 7 [PH] (ref 5–8)
PROT UR-MCNC: NEGATIVE MG/DL
SP GR UR STRIP: 1 (ref 1–1.03)
UROBILINOGEN UR STRIP-ACNC: <2
VIT C UR-MCNC: NEGATIVE MG/DL

## 2022-05-25 PROCEDURE — 81003 URINALYSIS AUTO W/O SCOPE: CPT

## 2022-08-26 ENCOUNTER — HOSPITAL ENCOUNTER (OUTPATIENT)
Dept: CV DIAGNOSTICS | Facility: HOSPITAL | Age: 72
Discharge: HOME | End: 2022-08-26
Attending: INTERNAL MEDICINE
Payer: MEDICARE

## 2022-08-26 ENCOUNTER — HOSPITAL ENCOUNTER (OUTPATIENT)
Dept: BONE DENSITY | Facility: HOSPITAL | Age: 72
Discharge: HOME | End: 2022-08-26
Attending: INTERNAL MEDICINE
Payer: MEDICARE

## 2022-08-26 DIAGNOSIS — Z78.0 MENOPAUSE: ICD-10-CM

## 2022-08-26 DIAGNOSIS — I27.20 PULMONARY HTN (HCC): ICD-10-CM

## 2022-08-26 PROCEDURE — 93306 TTE W/DOPPLER COMPLETE: CPT | Performed by: INTERNAL MEDICINE

## 2022-08-26 PROCEDURE — 77080 DXA BONE DENSITY AXIAL: CPT | Performed by: INTERNAL MEDICINE

## 2023-01-07 ENCOUNTER — LAB ENCOUNTER (OUTPATIENT)
Dept: LAB | Facility: HOSPITAL | Age: 73
End: 2023-01-07
Attending: INTERNAL MEDICINE
Payer: MEDICARE

## 2023-01-07 ENCOUNTER — OFFICE VISIT (OUTPATIENT)
Dept: OTOLARYNGOLOGY | Facility: CLINIC | Age: 73
End: 2023-01-07
Payer: MEDICARE

## 2023-01-07 ENCOUNTER — OFFICE VISIT (OUTPATIENT)
Dept: AUDIOLOGY | Facility: CLINIC | Age: 73
End: 2023-01-07
Payer: MEDICARE

## 2023-01-07 VITALS — BODY MASS INDEX: 26 KG/M2 | WEIGHT: 170 LBS

## 2023-01-07 DIAGNOSIS — H60.8X3 CHRONIC ECZEMATOUS OTITIS EXTERNA OF BOTH EARS: ICD-10-CM

## 2023-01-07 DIAGNOSIS — H90.6 MIXED HEARING LOSS, BILATERAL: ICD-10-CM

## 2023-01-07 DIAGNOSIS — H69.83 DYSFUNCTION OF BOTH EUSTACHIAN TUBES: ICD-10-CM

## 2023-01-07 DIAGNOSIS — I10 PRIMARY HYPERTENSION: ICD-10-CM

## 2023-01-07 DIAGNOSIS — H90.5 SENSORINEURAL HEARING LOSS (SNHL), UNSPECIFIED LATERALITY: Primary | ICD-10-CM

## 2023-01-07 DIAGNOSIS — H91.8X9 ASYMMETRICAL HEARING LOSS: Primary | ICD-10-CM

## 2023-01-07 LAB
ALBUMIN SERPL-MCNC: 3.4 G/DL (ref 3.4–5)
ALBUMIN/GLOB SERPL: 0.9 {RATIO} (ref 1–2)
ALP LIVER SERPL-CCNC: 98 U/L
ALT SERPL-CCNC: 34 U/L
ANION GAP SERPL CALC-SCNC: 5 MMOL/L (ref 0–18)
AST SERPL-CCNC: 26 U/L (ref 15–37)
BASOPHILS # BLD AUTO: 0.02 X10(3) UL (ref 0–0.2)
BASOPHILS NFR BLD AUTO: 0.6 %
BILIRUB SERPL-MCNC: 0.5 MG/DL (ref 0.1–2)
BILIRUB UR QL: NEGATIVE
BUN BLD-MCNC: 13 MG/DL (ref 7–18)
BUN/CREAT SERPL: 18.8 (ref 10–20)
CALCIUM BLD-MCNC: 8.7 MG/DL (ref 8.5–10.1)
CHLORIDE SERPL-SCNC: 102 MMOL/L (ref 98–112)
CHOLEST SERPL-MCNC: 198 MG/DL (ref ?–200)
CLARITY UR: CLEAR
CO2 SERPL-SCNC: 32 MMOL/L (ref 21–32)
COLOR UR: YELLOW
CREAT BLD-MCNC: 0.69 MG/DL
DEPRECATED RDW RBC AUTO: 45.7 FL (ref 35.1–46.3)
EOSINOPHIL # BLD AUTO: 0.23 X10(3) UL (ref 0–0.7)
EOSINOPHIL NFR BLD AUTO: 6.7 %
ERYTHROCYTE [DISTWIDTH] IN BLOOD BY AUTOMATED COUNT: 13.1 % (ref 11–15)
FASTING PATIENT LIPID ANSWER: YES
FASTING STATUS PATIENT QL REPORTED: YES
GFR SERPLBLD BASED ON 1.73 SQ M-ARVRAT: 91 ML/MIN/1.73M2 (ref 60–?)
GLOBULIN PLAS-MCNC: 3.6 G/DL (ref 2.8–4.4)
GLUCOSE BLD-MCNC: 84 MG/DL (ref 70–99)
GLUCOSE UR-MCNC: NEGATIVE MG/DL
HCT VFR BLD AUTO: 42.9 %
HDLC SERPL-MCNC: 96 MG/DL (ref 40–59)
HGB BLD-MCNC: 13.6 G/DL
IMM GRANULOCYTES # BLD AUTO: 0.01 X10(3) UL (ref 0–1)
IMM GRANULOCYTES NFR BLD: 0.3 %
KETONES UR-MCNC: NEGATIVE MG/DL
LDLC SERPL CALC-MCNC: 90 MG/DL (ref ?–100)
LEUKOCYTE ESTERASE UR QL STRIP.AUTO: NEGATIVE
LYMPHOCYTES # BLD AUTO: 1.21 X10(3) UL (ref 1–4)
LYMPHOCYTES NFR BLD AUTO: 35.1 %
MCH RBC QN AUTO: 29.9 PG (ref 26–34)
MCHC RBC AUTO-ENTMCNC: 31.7 G/DL (ref 31–37)
MCV RBC AUTO: 94.3 FL
MONOCYTES # BLD AUTO: 0.39 X10(3) UL (ref 0.1–1)
MONOCYTES NFR BLD AUTO: 11.3 %
NEUTROPHILS # BLD AUTO: 1.59 X10 (3) UL (ref 1.5–7.7)
NEUTROPHILS # BLD AUTO: 1.59 X10(3) UL (ref 1.5–7.7)
NEUTROPHILS NFR BLD AUTO: 46 %
NITRITE UR QL STRIP.AUTO: NEGATIVE
NONHDLC SERPL-MCNC: 102 MG/DL (ref ?–130)
OSMOLALITY SERPL CALC.SUM OF ELEC: 287 MOSM/KG (ref 275–295)
PH UR: 7 [PH] (ref 5–8)
PLATELET # BLD AUTO: 284 10(3)UL (ref 150–450)
POTASSIUM SERPL-SCNC: 4.1 MMOL/L (ref 3.5–5.1)
PROT SERPL-MCNC: 7 G/DL (ref 6.4–8.2)
PROT UR-MCNC: NEGATIVE MG/DL
RBC # BLD AUTO: 4.55 X10(6)UL
SODIUM SERPL-SCNC: 139 MMOL/L (ref 136–145)
SP GR UR STRIP: 1.01 (ref 1–1.03)
T4 FREE SERPL-MCNC: 0.9 NG/DL (ref 0.8–1.7)
TRIGL SERPL-MCNC: 63 MG/DL (ref 30–149)
TSI SER-ACNC: 3.77 MIU/ML (ref 0.36–3.74)
UROBILINOGEN UR STRIP-ACNC: 0.2
VLDLC SERPL CALC-MCNC: 10 MG/DL (ref 0–30)
WBC # BLD AUTO: 3.5 X10(3) UL (ref 4–11)

## 2023-01-07 PROCEDURE — 36415 COLL VENOUS BLD VENIPUNCTURE: CPT

## 2023-01-07 PROCEDURE — 85025 COMPLETE CBC W/AUTO DIFF WBC: CPT

## 2023-01-07 PROCEDURE — 81015 MICROSCOPIC EXAM OF URINE: CPT

## 2023-01-07 PROCEDURE — 80061 LIPID PANEL: CPT

## 2023-01-07 PROCEDURE — 92557 COMPREHENSIVE HEARING TEST: CPT | Performed by: AUDIOLOGIST

## 2023-01-07 PROCEDURE — 81001 URINALYSIS AUTO W/SCOPE: CPT

## 2023-01-07 PROCEDURE — 80053 COMPREHEN METABOLIC PANEL: CPT

## 2023-01-07 PROCEDURE — 84443 ASSAY THYROID STIM HORMONE: CPT

## 2023-01-07 PROCEDURE — 84439 ASSAY OF FREE THYROXINE: CPT

## 2023-01-07 PROCEDURE — 99203 OFFICE O/P NEW LOW 30 MIN: CPT | Performed by: SPECIALIST

## 2023-01-07 PROCEDURE — 92567 TYMPANOMETRY: CPT | Performed by: AUDIOLOGIST

## 2023-01-07 NOTE — PATIENT INSTRUCTIONS
You had a bilateral eczematoid otitis externa worse on the right. An audiogram was done to evaluate your underlying hearing loss. 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 This is consistent with your history of otologic problems in childhood.

## 2023-02-27 ENCOUNTER — HOSPITAL ENCOUNTER (OUTPATIENT)
Dept: MAMMOGRAPHY | Facility: HOSPITAL | Age: 73
Discharge: HOME | End: 2023-02-27
Attending: INTERNAL MEDICINE
Payer: MEDICARE

## 2023-02-27 DIAGNOSIS — Z12.31 VISIT FOR SCREENING MAMMOGRAM: ICD-10-CM

## 2023-02-27 PROCEDURE — 77063 BREAST TOMOSYNTHESIS BI: CPT | Performed by: INTERNAL MEDICINE

## 2023-02-27 PROCEDURE — 77067 SCR MAMMO BI INCL CAD: CPT | Performed by: INTERNAL MEDICINE

## 2023-06-11 ENCOUNTER — HOSPITAL ENCOUNTER (OUTPATIENT)
Dept: CT IMAGING | Age: 73
Discharge: HOME | End: 2023-06-11
Attending: INTERNAL MEDICINE

## 2023-06-11 DIAGNOSIS — Z13.6 SCREENING FOR CARDIOVASCULAR CONDITION: ICD-10-CM

## 2023-08-15 ENCOUNTER — TELEPHONE (OUTPATIENT)
Dept: OPHTHALMOLOGY | Facility: CLINIC | Age: 73
End: 2023-08-15

## 2023-08-15 NOTE — TELEPHONE ENCOUNTER
Spoke to pt and she is scheduled to have her 2nd cataract surgery done on 8/24/23. She will get reading glasses Rx from Dr. Samir Soto at post op visit.

## 2023-08-15 NOTE — TELEPHONE ENCOUNTER
Pt is finishing her cataract surgeries next week - asking when she should come see Dr after that to get a new script

## 2024-02-05 ENCOUNTER — HOSPITAL ENCOUNTER (OUTPATIENT)
Age: 74
Discharge: HOME | End: 2024-02-05
Payer: MEDICARE

## 2024-02-05 VITALS
OXYGEN SATURATION: 98 % | TEMPERATURE: 99 F | SYSTOLIC BLOOD PRESSURE: 155 MMHG | RESPIRATION RATE: 18 BRPM | DIASTOLIC BLOOD PRESSURE: 89 MMHG | HEART RATE: 72 BPM

## 2024-02-05 DIAGNOSIS — J02.9 VIRAL PHARYNGITIS: Primary | ICD-10-CM

## 2024-02-05 LAB — S PYO AG THROAT QL: NEGATIVE

## 2024-02-05 PROCEDURE — 87880 STREP A ASSAY W/OPTIC: CPT | Performed by: NURSE PRACTITIONER

## 2024-02-05 PROCEDURE — 99213 OFFICE O/P EST LOW 20 MIN: CPT | Performed by: NURSE PRACTITIONER

## 2024-02-05 NOTE — ED INITIAL ASSESSMENT (HPI)
Pt c/o sore throat and post nasal drip x4 days.  States grandchildren she watches have strep throat.  Neg home covid test.

## 2024-02-05 NOTE — ED PROVIDER NOTES
Patient Seen in: [facility]    History   CC: sore throat  HPI: Christelle Rachel 73 year old female  who presents c/o sore throat x3 days w/ pnd. Hx of chronic rhinitis however states exposure to 3/7 grandkids who have strep. Denies sandra, difficulty swallowing/drooling, cough, congestion, fever, rash or GI signs/symptoms.    Past Medical History:   Diagnosis Date    Breast cancer (HCC) 11/2007    Cataracts, bilateral 1998    OU    Cup to disc asymmetry 1998    Per NG: both eyes    Essential hypertension     Papilloma of right eyelid 10/28/2014    Sebaceous cyst of right eyelid 10/28/2014       Past Surgical History:   Procedure Laterality Date    CATARACT Bilateral 1998    CATARACT EXTRACTION Right 2023    CATARACT EXTRACTION W/  INTRAOCULAR LENS IMPLANT Left 09/05/2023    done  by Dr. Garrison    LUMPECTOMY LEFT  2007    RADIATION LEFT         Family History   Problem Relation Age of Onset    Cataracts Mother     Cataracts Father     Breast Cancer Self 56    Glaucoma Neg     Diabetes Neg     Macular degeneration Neg        Social History     Socioeconomic History    Marital status:    Tobacco Use    Smoking status: Never    Smokeless tobacco: Never   Vaping Use    Vaping Use: Never used   Substance and Sexual Activity    Alcohol use: No     Alcohol/week: 0.0 standard drinks of alcohol    Drug use: No   Other Topics Concern    Caffeine Concern Yes     Comment: Cojustyn       ROS:  Review of Systems    Positive for stated complaint: Sore Throat - ears.. My grandchildren have strep and I want to be sure I dont h*  Other systems are as noted in HPI.  Constitutional and vital signs reviewed.      All other systems reviewed and negative except as noted above.    PSFH elements reviewed from today and agreed except as otherwise stated in HPI.             Constitutional and vital signs reviewed.        Physical Exam     ED Triage Vitals   BP 02/05/24 1326 155/89   Pulse 02/05/24 1326 72   Resp 02/05/24 1326  18   Temp 02/05/24 1326 98.5 °F (36.9 °C)   Temp src 02/05/24 1326 Temporal   SpO2 02/05/24 1326 98 %   O2 Device 02/05/24 1323 None (Room air)       Current:/89   Pulse 72   Temp 98.5 °F (36.9 °C) (Temporal)   Resp 18   SpO2 98%         PE:  General - Appears well, non-toxic and in NAD  Head - Appears symmetrical without deformity/swelling cranium, scalp, or facial bones  Eyes - sclera not injected, no discharge noted, no periorbital edema  ENT - EAC bilaterally without discharge, TM pearly grey with COL visualized appropriately bilaterally.   no nasal drainage noted in nares bilat, no cobblestoning to post. Pharynx.   Oropharynx clear, posterior pharynx is erythematous, uvula midline, +gag, voice is clear. No trismus  Neck - no significant adenopathy, supple with trachea midline  Resp - Lung sounds clear bilaterally and wob unlabored, good aeration with equal, even expansion bilaterally   CV - RRR  Skin - no rashes or petechiae noted, pink warm and dry throughout, mmm, cap refill <2seconds  Neuro - A&O x4, steady gait  MSK - makes purposeful movements of all extremities, radial pulses 2+ bilat.  Psych - Interactive and appropriate      ED Course     Labs Reviewed   POCT RAPID STREP - Normal       MDM     Rapid strep negative.  Patient declines COVID PCR stating she took a home test 30 minutes prior to arrival in the immediate care.  General viral illness instructions reviewed, rest, hydration instructions, Tylenol or Motrin as needed for discomfort, warm beverages, salt water gargles, throat lozenges etc. as well as follow-up and return/ED precautions reviewed.  Patient demonstrates understanding of instruction and agrees with plan of care.      Disposition and Plan     Clinical Impression:  1. Viral pharyngitis        Disposition:  Discharge    Follow-up:  Johanny Hancock MD  [address] [id number] [phone]    Schedule an appointment as soon as possible for a visit in 2  days        Medications Prescribed:  Discharge Medication List as of 2/5/2024  1:36 PM

## 2024-05-13 ENCOUNTER — HOSPITAL ENCOUNTER (OUTPATIENT)
Dept: MAMMOGRAPHY | Facility: HOSPITAL | Age: 74
Discharge: HOME | End: 2024-05-13
Attending: INTERNAL MEDICINE

## 2024-05-13 DIAGNOSIS — Z12.31 VISIT FOR SCREENING MAMMOGRAM: ICD-10-CM

## 2024-05-27 ENCOUNTER — OFFICE VISIT (OUTPATIENT)
Dept: FAMILY MEDICINE CLINIC | Facility: CLINIC | Age: 74
End: 2024-05-27

## 2024-05-27 VITALS
TEMPERATURE: 98 F | RESPIRATION RATE: 16 BRPM | BODY MASS INDEX: 26 KG/M2 | OXYGEN SATURATION: 97 % | HEART RATE: 64 BPM | HEIGHT: 68 IN | DIASTOLIC BLOOD PRESSURE: 52 MMHG | SYSTOLIC BLOOD PRESSURE: 148 MMHG

## 2024-05-27 DIAGNOSIS — J02.9 ACUTE PHARYNGITIS, UNSPECIFIED ETIOLOGY: Primary | ICD-10-CM

## 2024-05-27 DIAGNOSIS — Z20.818 EXPOSURE TO STREP THROAT: ICD-10-CM

## 2024-05-27 LAB
CONTROL LINE PRESENT WITH A CLEAR BACKGROUND (YES/NO): YES YES/NO
KIT LOT #: NORMAL NUMERIC
STREP GRP A CUL-SCR: NEGATIVE

## 2024-05-27 PROCEDURE — 87081 CULTURE SCREEN ONLY: CPT | Performed by: PHYSICIAN ASSISTANT

## 2024-05-27 RX ORDER — AMOXICILLIN 500 MG/1
500 CAPSULE ORAL 2 TIMES DAILY
Qty: 20 CAPSULE | Refills: 0 | Status: SHIPPED | OUTPATIENT
Start: 2024-05-27 | End: 2024-06-06

## 2024-05-27 NOTE — PROGRESS NOTES
CHIEF COMPLAINT:     Chief Complaint   Patient presents with    Sore Throat     Sx Friday PM - ST, nausea, grandkids had + strep, slight bilat ear pressure  Denies fever, ear pain, cough, headaches, nasal congestion  No OTC         HPI:   Christelle Rachel is a 74 year old female who presents with complaints of sore throat for the past 3 days.  The patient reports fever of 100 the first day of illness. The patient denies nasal congestion and nasal drainage.  She reports some post nasal drip.  She denies ear pain, cough, SOB, wheezing, abdominal pain, vomiting, diarrhea, and rash.  The patient is tolerating po. 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     Current Outpatient Medications   Medication Sig Dispense Refill    cholecalciferol 400 units/mL Oral Liquid Take by mouth As Directed.      amoxicillin 500 MG Oral Cap Take 1 capsule (500 mg total) by mouth 2 (two) times daily for 10 days. 20 capsule 0    losartan 50 MG Oral Tab Take 1 tablet (50 mg total) by mouth daily. 90 tablet 1    hydrochlorothiazide 12.5 MG Oral Tab Take 1 tablet (12.5 mg total) by mouth daily. 90 tablet 1    aspirin 81 MG Oral Tab Take 1 tablet (81 mg total) by mouth daily.      Multiple Vitamins-Minerals (CENTRUM SILVER) Oral Tab Take 1 tablet by mouth daily.        Past Medical History:    Breast cancer (HCC)    Cataracts, bilateral    OU    Cup to disc asymmetry    Per NG: both eyes    Essential hypertension    Papilloma of right eyelid    Sebaceous cyst of right eyelid      Social History:  Social History     Socioeconomic History    Marital status:    Tobacco Use    Smoking status: Never    Smokeless tobacco: Never   Vaping Use    Vaping status: Never Used   Substance and Sexual Activity    Alcohol use: No     Alcohol/week: 0.0 standard drinks of alcohol    Drug use: No   Other Topics Concern    Caffeine Concern Yes     Comment: Brown        REVIEW OF SYSTEMS:   GENERAL: See HPI  SKIN:  Denies rashes, skin wounds or ulcers.  EYES: Denies blurred vision or double vision  HENT: See HPI  CHEST: Denies chest pain, or palpitations  LUNGS: Denies shortness of breath, cough, or wheezing  GI: Denies abdominal pain, N/V/C/D.   MUSCULOSKELETAL: no arthralgia or swollen joints  LYMPH:  Denies lymphadenopathy  NEURO: Denies headaches or lightheadedness      EXAM:   /52   Pulse 64   Temp 98.4 °F (36.9 °C)   Resp 16   Ht 5' 8\" (1.727 m)   SpO2 97%   BMI 25.85 kg/m²   GENERAL: well developed, well nourished,in no apparent distress, cooperative   SKIN: no rashes, nosuspicious lesions, no abnormal pigmentation  HEAD: atraumatic, normocephalic  EYES: EOM intact, PERRL.  Conjunctiva normal.  Cornea clear.  Lid margins normal.  No active drainage.  EARS: Right TM normal, no bulging, no retraction, no fluid, bony landmarks normal.  Left TM normal, no bulging, no retraction, no fluid, bony landmarks normal.    NOSE: nostrils patent, no discharge, nasal mucosa pink and not inflamed.  No sinus tenderness.  THROAT: oral mucosa pink, moist and intact. No visible dental caries. Posterior pharynx mild erythema and without exudates.  Uvula is midline.  NECK: supple, non-tender.  LUNGS: clear to auscultation bilaterally, no wheezes or rhonchi. Breathing is non labored.  CARDIO: RRR without murmur  GI: No visible scars, or masses. BS's present x4. No palpable masses or hepatosplenomegaly.  Non tender.  No guarding or rebound tenderness  EXTREMITIES: no cyanosis, clubbing or edema.  Homans NEG.  Dorsalis Pedis 2+.  LYMPH:  No lymphadenopathy.    NEURO: A&Ox3.  CN II-XII intact.  No focal deficits.  Coordination and Gait normal.  Kernig and Brudzinski's are negative.    Rapid Strep is NEGATIVE       ASSESSMENT AND PLAN:     ASSESSMENT:  Encounter Diagnoses   Name Primary?    Acute pharyngitis, unspecified etiology Yes    Exposure to strep throat        PLAN:    Patient Instructions   Throat culture  sent  Amoxicillin  OTC symptoms support  Switch tooth brush   If negative culture stop Amoxicillin

## 2024-05-27 NOTE — PATIENT INSTRUCTIONS
Throat culture sent  Amoxicillin  OTC symptoms support  Switch tooth brush   If negative culture stop Amoxicillin

## 2024-06-17 ENCOUNTER — HOSPITAL ENCOUNTER (OUTPATIENT)
Dept: MAMMOGRAPHY | Facility: HOSPITAL | Age: 74
Discharge: HOME | End: 2024-06-17
Attending: INTERNAL MEDICINE

## 2024-06-17 DIAGNOSIS — Z12.31 ENCOUNTER FOR SCREENING MAMMOGRAM FOR MALIGNANT NEOPLASM OF BREAST: ICD-10-CM

## 2024-06-17 PROCEDURE — 77067 SCR MAMMO BI INCL CAD: CPT | Performed by: INTERNAL MEDICINE

## 2024-06-17 PROCEDURE — 77063 BREAST TOMOSYNTHESIS BI: CPT | Performed by: INTERNAL MEDICINE

## 2024-08-22 ENCOUNTER — HOSPITAL ENCOUNTER (OUTPATIENT)
Dept: CV DIAGNOSTICS | Facility: HOSPITAL | Age: 74
Discharge: HOME | End: 2024-08-22
Attending: INTERNAL MEDICINE
Payer: MEDICARE

## 2024-08-22 DIAGNOSIS — I27.20 PULMONARY HTN (HCC): ICD-10-CM

## 2024-08-22 PROCEDURE — 93306 TTE W/DOPPLER COMPLETE: CPT | Performed by: INTERNAL MEDICINE

## 2024-08-30 ENCOUNTER — HOSPITAL ENCOUNTER (OUTPATIENT)
Dept: BONE DENSITY | Facility: HOSPITAL | Age: 74
Discharge: HOME | End: 2024-08-30
Attending: INTERNAL MEDICINE
Payer: MEDICARE

## 2024-08-30 DIAGNOSIS — Z78.0 MENOPAUSE: ICD-10-CM

## 2024-08-30 PROCEDURE — 77080 DXA BONE DENSITY AXIAL: CPT | Performed by: INTERNAL MEDICINE

## 2024-09-05 ENCOUNTER — LAB ENCOUNTER (OUTPATIENT)
Dept: LAB | Facility: HOSPITAL | Age: 74
End: 2024-09-05
Attending: INTERNAL MEDICINE
Payer: MEDICARE

## 2024-09-05 DIAGNOSIS — I10 HYPERTENSION: Primary | ICD-10-CM

## 2024-09-05 LAB
ALBUMIN SERPL-MCNC: 4.2 G/DL (ref 3.2–4.8)
ALBUMIN/GLOB SERPL: 1.7 {RATIO} (ref 1–2)
ALP LIVER SERPL-CCNC: 76 U/L
ALT SERPL-CCNC: 19 U/L
ANION GAP SERPL CALC-SCNC: 6 MMOL/L (ref 0–18)
AST SERPL-CCNC: 28 U/L (ref ?–34)
BASOPHILS # BLD AUTO: 0.03 X10(3) UL (ref 0–0.2)
BASOPHILS NFR BLD AUTO: 0.8 %
BILIRUB SERPL-MCNC: 0.9 MG/DL (ref 0.2–1.1)
BILIRUB UR QL: NEGATIVE
BUN BLD-MCNC: 19 MG/DL (ref 9–23)
BUN/CREAT SERPL: 23.2 (ref 10–20)
CALCIUM BLD-MCNC: 9.9 MG/DL (ref 8.7–10.4)
CHLORIDE SERPL-SCNC: 104 MMOL/L (ref 98–112)
CHOLEST SERPL-MCNC: 198 MG/DL (ref ?–200)
CLARITY UR: CLEAR
CO2 SERPL-SCNC: 31 MMOL/L (ref 21–32)
CREAT BLD-MCNC: 0.82 MG/DL
DEPRECATED RDW RBC AUTO: 43.9 FL (ref 35.1–46.3)
EGFRCR SERPLBLD CKD-EPI 2021: 74 ML/MIN/1.73M2 (ref 60–?)
EOSINOPHIL # BLD AUTO: 0.17 X10(3) UL (ref 0–0.7)
EOSINOPHIL NFR BLD AUTO: 4.5 %
ERYTHROCYTE [DISTWIDTH] IN BLOOD BY AUTOMATED COUNT: 13 % (ref 11–15)
FASTING PATIENT LIPID ANSWER: YES
FASTING STATUS PATIENT QL REPORTED: YES
GLOBULIN PLAS-MCNC: 2.5 G/DL (ref 2–3.5)
GLUCOSE BLD-MCNC: 95 MG/DL (ref 70–99)
GLUCOSE UR-MCNC: NORMAL MG/DL
HCT VFR BLD AUTO: 41.5 %
HDLC SERPL-MCNC: 72 MG/DL (ref 40–59)
HGB BLD-MCNC: 14 G/DL
HYALINE CASTS #/AREA URNS AUTO: PRESENT /LPF
IMM GRANULOCYTES # BLD AUTO: 0.01 X10(3) UL (ref 0–1)
IMM GRANULOCYTES NFR BLD: 0.3 %
KETONES UR-MCNC: NEGATIVE MG/DL
LDLC SERPL CALC-MCNC: 115 MG/DL (ref ?–100)
LEUKOCYTE ESTERASE UR QL STRIP.AUTO: 250
LYMPHOCYTES # BLD AUTO: 1.53 X10(3) UL (ref 1–4)
LYMPHOCYTES NFR BLD AUTO: 40.9 %
MCH RBC QN AUTO: 31 PG (ref 26–34)
MCHC RBC AUTO-ENTMCNC: 33.7 G/DL (ref 31–37)
MCV RBC AUTO: 91.8 FL
MONOCYTES # BLD AUTO: 0.38 X10(3) UL (ref 0.1–1)
MONOCYTES NFR BLD AUTO: 10.2 %
NEUTROPHILS # BLD AUTO: 1.62 X10 (3) UL (ref 1.5–7.7)
NEUTROPHILS # BLD AUTO: 1.62 X10(3) UL (ref 1.5–7.7)
NEUTROPHILS NFR BLD AUTO: 43.3 %
NITRITE UR QL STRIP.AUTO: NEGATIVE
NONHDLC SERPL-MCNC: 126 MG/DL (ref ?–130)
OSMOLALITY SERPL CALC.SUM OF ELEC: 294 MOSM/KG (ref 275–295)
PH UR: 6.5 [PH] (ref 5–8)
PLATELET # BLD AUTO: 269 10(3)UL (ref 150–450)
POTASSIUM SERPL-SCNC: 4 MMOL/L (ref 3.5–5.1)
PROT SERPL-MCNC: 6.7 G/DL (ref 5.7–8.2)
PROT UR-MCNC: NEGATIVE MG/DL
RBC # BLD AUTO: 4.52 X10(6)UL
SODIUM SERPL-SCNC: 141 MMOL/L (ref 136–145)
SP GR UR STRIP: 1.02 (ref 1–1.03)
TRIGL SERPL-MCNC: 62 MG/DL (ref 30–149)
TSI SER-ACNC: 3.75 MIU/ML (ref 0.55–4.78)
UROBILINOGEN UR STRIP-ACNC: NORMAL
VIT B12 SERPL-MCNC: 527 PG/ML (ref 211–911)
VLDLC SERPL CALC-MCNC: 11 MG/DL (ref 0–30)
WBC # BLD AUTO: 3.7 X10(3) UL (ref 4–11)

## 2024-09-05 PROCEDURE — 84443 ASSAY THYROID STIM HORMONE: CPT

## 2024-09-05 PROCEDURE — 85025 COMPLETE CBC W/AUTO DIFF WBC: CPT

## 2024-09-05 PROCEDURE — 80061 LIPID PANEL: CPT

## 2024-09-05 PROCEDURE — 36415 COLL VENOUS BLD VENIPUNCTURE: CPT

## 2024-09-05 PROCEDURE — 80053 COMPREHEN METABOLIC PANEL: CPT

## 2024-09-05 PROCEDURE — 82607 VITAMIN B-12: CPT

## 2024-09-05 PROCEDURE — 81001 URINALYSIS AUTO W/SCOPE: CPT

## 2025-06-11 NOTE — ASSESSMENT & PLAN NOTE
Incidentally noted post radiation fibrosis in the anterior left upper lobe as well as some scattered pleuroparenchymal scarring.   She does not have any significant shortness of breath but as radiation fibrosis could be progressive will obtain a pulmonary f 127

## 2025-06-26 ENCOUNTER — HOSPITAL ENCOUNTER (OUTPATIENT)
Dept: MAMMOGRAPHY | Facility: HOSPITAL | Age: 75
Discharge: HOME | End: 2025-06-26
Attending: INTERNAL MEDICINE
Payer: MEDICARE

## 2025-06-26 DIAGNOSIS — Z12.31 ENCOUNTER FOR MAMMOGRAM TO ESTABLISH BASELINE MAMMOGRAM: ICD-10-CM

## 2025-06-26 PROCEDURE — 77063 BREAST TOMOSYNTHESIS BI: CPT | Performed by: INTERNAL MEDICINE

## 2025-06-26 PROCEDURE — 77067 SCR MAMMO BI INCL CAD: CPT | Performed by: INTERNAL MEDICINE

## 2025-08-18 ENCOUNTER — HOSPITAL ENCOUNTER (OUTPATIENT)
Dept: CV DIAGNOSTICS | Facility: HOSPITAL | Age: 75
Discharge: HOME | End: 2025-08-18
Attending: INTERNAL MEDICINE

## 2025-08-18 DIAGNOSIS — Q92.8: ICD-10-CM

## 2025-08-18 PROCEDURE — 76376 3D RENDER W/INTRP POSTPROCES: CPT | Performed by: INTERNAL MEDICINE

## 2025-08-18 PROCEDURE — 93306 TTE W/DOPPLER COMPLETE: CPT | Performed by: INTERNAL MEDICINE

## (undated) NOTE — LETTER
Patient Name: Sanchez Mabry  : 1950  MRN: [id number]  Patient Address: [place] [address].      Coronavirus Disease 2019 (COVID-19)     [facility] is committed to the safety and well-being of our patients, [facility] carefully. If your symptoms get worse, call your healthcare provider immediately. 3. Get rest and stay hydrated.    4. If you have a medical appointment, call the healthcare provider ahead of time and tell them that you have or may have COVID-19.  5. For m of fever-reducing medications; and  · Improvement in respiratory symptoms (e.g., cough, shortness of breath); and  · At least 10 days have passed since symptoms first appeared OR if asymptomatic patient or date of symptom onset is unclear then use 10 days donors must:    · Have had a confirmed diagnosis of COVID-19  · Be symptom-free for at least 14 days*    *Some people will be required to have a repeat COVID-19 test in order to be eligible to donate.  If you’re instructed by Tobi that a repeat test is r random. Researchers are trying to identify similarities between people with a Post-COVID condition to better understand if there are risk factors. How do I prevent a Post-COVID condition?   The best way to prevent the long-term symptoms of COVID-19 is

## (undated) NOTE — LETTER
7/21/2021            [address]       Dear Candelario Hannon,    The visual field test you took on 7/21/2021 indicated normal field of vision in both eyes.   An optic nerve analysis showed normal re

## (undated) NOTE — LETTER
7/6/2021    Patient: Tenzin Zarate   MR Number: [id number]   YOB: 1950   Date of Visit: 7/6/2021   Physician: Adelita Garcia MD     Dear Medicare Patient:  Ban [place] TO BENEFICIARY:  Please know that while a refraction is

## (undated) NOTE — Clinical Note
Dear Beata Velez,  It is with great pleasure that we were able to provide treatment to Alessandro Mullins in the DEPARTMENT OF [facility] today. The patient was seen for maxillary sinusitis.   In order to keep you informed on your patient's care I ha

## (undated) NOTE — MR AVS SNAPSHOT
Connie  [address]  [phone]               Thank you for choosing us for your health care visit with Ayleen Ulrich MD.  We are glad to serve you and happy to provide you with this summa office, you can view your past visit information in Commt[employer] by going to Visits < Visit Summaries. Commtimize questions? Call [phone] for help. [employer] is NOT to be used for urgent needs. For medical emergencies, dial 911.            Visit [facility]

## (undated) NOTE — MR AVS SNAPSHOT
Connie  [address]  [phone]               Thank you for choosing us for your health care visit with Angela Parson MD.  We are glad to serve you and happy to provide you with this summa Glaucoma suspect of both eyes  Discussed with patient that she is a glaucoma suspect based on increased cupping of the optic nerve in the right eye and cup to disc asymmetry of the optic nerves. .  Retinal photos taken today to document optic nerves.   [employer] [address]. All rights reserved. This information is not intended as a substitute for professional medical care. Always follow your healthcare professional's instructions. Treating Glaucoma  Treatment can prevent or limit vision loss from glaucoma.  The g view more details from this visit by going to [email or web address]. If you've recently had a stay at the Hospital you can access your discharge instructions in [employer] by going to Visits < Admission Summaries.  If you've been to the Emergency Depar priority on exercise in your life                    Visit [facility] online at  [email or web address]

## (undated) NOTE — LETTER
11/14/19        [address]      Dear Delmi Thomas records indicate that you have outstanding lab work and or testing that was ordered for you and has not yet been completed:  Orders Placed This Encoun

## (undated) NOTE — MR AVS SNAPSHOT
[address] [address]  [phone]               Thank you for choosing us for your health care visit with Calin Asencio MD.  We are glad to serve you and happy to provide you with this summary Follow Up with Our Office     Return in about 6 months (around 7/3/2017).       Your Appointments     Mar 21, 2017  4:00 PM   Exam - New Patient with Jessy Singh MD   [facility]hthal[facility] ([facility]) Summaries. If you've been to the Emergency Department or your doctor's office, you can view your past visit information in unbound technologies by going to Visits < Visit Summaries. unbound technologies questions? Call [phone] for help.   unbound technologies is NOT to be used for urge

## (undated) NOTE — LETTER
Patient Name: Alessandro Mullins  : 1950  MRN: [id number]  Patient Address: [place] [address].      Coronavirus Disease 2019 (COVID-19)     [facility] is committed to the safety and well-being of our patients, lm carefully. If your symptoms get worse, call your healthcare provider immediately. 3. Get rest and stay hydrated.    4. If you have a medical appointment, call the healthcare provider ahead of time and tell them that you have or may have COVID-19.  5. For m of fever-reducing medications; and  · Improvement in respiratory symptoms (e.g., cough, shortness of breath); and  · At least 10 days have passed since symptoms first appeared OR if asymptomatic patient or date of symptom onset is unclear then use 10 days donors must:    · Have had a confirmed diagnosis of COVID-19  · Be symptom-free for at least 14 days*    *Some people will be required to have a repeat COVID-19 test in order to be eligible to donate.  If you’re instructed by [facility] that a repeat test is r random. Researchers are trying to identify similarities between people with a Post-COVID condition to better understand if there are risk factors. How do I prevent a Post-COVID condition?   The best way to prevent the long-term symptoms of COVID-19 is